# Patient Record
Sex: FEMALE | Race: BLACK OR AFRICAN AMERICAN | NOT HISPANIC OR LATINO | Employment: UNEMPLOYED | URBAN - METROPOLITAN AREA
[De-identification: names, ages, dates, MRNs, and addresses within clinical notes are randomized per-mention and may not be internally consistent; named-entity substitution may affect disease eponyms.]

---

## 2017-02-13 ENCOUNTER — CLINICAL SUPPORT (OUTPATIENT)
Dept: REHABILITATION | Facility: HOSPITAL | Age: 52
End: 2017-02-13
Attending: INTERNAL MEDICINE
Payer: MEDICAID

## 2017-02-13 DIAGNOSIS — M62.81 QUADRICEPS WEAKNESS: ICD-10-CM

## 2017-02-13 DIAGNOSIS — M22.8X2 PATELLAR TRACKING DISORDER OF LEFT KNEE: ICD-10-CM

## 2017-02-13 DIAGNOSIS — M22.8X1 PATELLAR TRACKING DISORDER OF RIGHT KNEE: ICD-10-CM

## 2017-02-13 PROCEDURE — 97161 PT EVAL LOW COMPLEX 20 MIN: CPT

## 2017-02-13 NOTE — PLAN OF CARE
TIME RECORD    Date: 02/13/2017    Start Time:  1:00  Stop Time:  1:45    PROCEDURES:    TIMED  Procedure Min.                         UNTIMED  Procedure Min.   PT eval 45         Total Timed Minutes:  0  Total Timed Units:  0  Total Untimed Units:  1  Charges Billed/# of units:  1    OUTPATIENT PHYSICAL THERAPY   PATIENT EVALUATION  Onset Date: Chronic  Primary Diagnosis: No diagnosis found.  Treatment Diagnosis: weakness, lateral tracking of B patella  Past Medical History   Diagnosis Date    Depression     Hepatitis      HBV    Hyperlipidemia     Liver disease stage 2 liver disease    Sleep apnea      Precautions: Standard  Prior Therapy: PT previously a number of years ago, PT eval last year at this facility for knee pain  Medications: Madison Hill has a current medication list which includes the following prescription(s): amoxicillin, aripiprazole, atorvastatin, citalopram, hydrocodone-acetaminophen 10-325mg, pantoprazole, and tramadol.  Nutrition:  Overweight  History of Present Illness: Chronic B knee pain  Prior Level of Function: Independent  Social History: Lives with friend  Place of Residence (Steps/Adaptations): single story home, no steps  Functional Deficits Leading to Referral/Nature of Injury: increased pain with walking, sitting, running, transfers, and bed mobility  Patient Therapy Goals: to get better    Subjective     Madison Hill states she is not sure what is going on with her knees, something about her kneecaps go to the side. She has been having trouble with her knees for years. Her L knee just gave up on her three weeks ago while she was walking and she , but she didn't get hurt. Currently she has trouble walking because it be hurting a lot, sitting too long and get up it starts hurting, running increases pain but she can't remember when she could last run, she needs help most of the time getting in bed, can't get in/out the car without lifting her legs up with her hands, no  difficulty getting in/out of the shower but she can't get up from bottom of the tub. She can't even walk half a block without pain and her pain medication doesn't help as it is not strong enough.    Pain:  Location: knee L>R  Description: Aching, Burning, Numb, Sharp and all the above  Activities Which Increase Pain: Sitting, Standing, Bending, Walking, Lifting, Getting out of bed/chair and everything  Activities Which Decrease Pain: rest  Pain Scale: 5/10 at best 5/10 now  8/10 at worst    Objective     Posture: knees flexed to 90* in sitting, increased WB on L hip in sitting, lateral tracking of B patella with squatting  Palpation: diffuse tenderness around L patella into quad, medial and lateral joint line L mild crepitus  Sensation: reports decreased light touch over L thigh, L medial calf  DTRs: 2+  Range of Motion/Strength:     Hip Right  Left  Pain/Dysfunction with Movement    AROM MMT AROM MMT    Flexion WFL 3+/5 WFL 3/5    Extension NT 3/5 NT 3/5    Abduction WFL 3+/5 WFL 3+/5       Knee  Right   Left  Pain/Dysfunction with Movement    AROM PROM MMT AROM PROM MMT    Flexion 130 140 3+/5 127 140 3/5 Pain L achilles flex  MMT, ant. L ankle ext MMT   Extension 0 0 4-/5 0 0 4-/5      Ankle Right  Left  Pain/Dysfunction with Movement    AROM MMT AROM MMT    Plantarflexion WFL 5/5 WFL 5/5    Dorsiflexion WFL 4-/5 WFL 4-/5          Flexibility: SLR L 45* pulling in HS, R 62* pulling in HS; B patella hypermobile laterally  Gait: Without AD  Analysis: Assistance none, unremarkable  Bed Mobility:Assistance - reports needing assistance to get out of bed at times  Transfers: Assistance - reports needing help with transfers most of the time  Special Tests: Patella apprehension negative bilaterally, Lachman's, Posterior Drawer, Valgus and Varus stress tests are negative  Other: FOTO knee 39, 60%<80%  Treatment: Patient was educated on the plan of care and treatment options. She is in agreement with the plan of  care.    Assessment       Initial Assessment (Pertinent finding, problem list and factors affecting outcome): Ms. Hill is a 51 year old female, who presents to the clinic with complaints of B knee pain that limits her ability to perform her usual ADLs and transfers without pain. While she was eating [employer] Weez during her evaluation she demonstrated B knee AROM WFL with no complaints of pain. She did demonstrate B LE weakness with complaints of L achilles and anterior ankle pain with L knee MMT. Her LE weakness limits her ability to perform her usual ADLs and transfers without an increase in symptoms. She did perform sit to stand multiple times and bed mobility during the session with no difficulty or complaints of pain. She ambulated inside the clinic with a normal gait pattern and no LOB. She had no ligamentous laxity, negative patella apprehension test, but mild crepitus in B knees. She has diffuse tenderness to palpation in her L quadriceps, around L patella, along medial and lateral L knee joint line. Her score on FOTO 39 places her in the 60%<80% impaired, limited, restricted category. She would benefit from physical therapy to improve her strength and functional mobility.  History  Co-morbidities and personal factors that may impact the plan of care Examination  Body Structures and Functions, activity limitations and participation restrictions that may impact the plan of care Clinical Presentation   Decision Making/ Complexity Score   Co-morbidities:   schizophrenia              Personal Factors:   None Body Regions:B LE    Body Systems: Musculoskeletal - B LE weakness, tenderness to palpation L knee medial and lateral joint line, L quads and patella, B hamstring tightness; Neuromuscular - N/A; Cardiovascular - N/A; Integumentary - N/A          Activity limitations/Participation Restrictions: Patient's schizophrenia may limit her ability to be compliant with her HEP and therapy sessions.         Stable and  uncomplicated Low complexity    FOTO knee 39, 60%<80%       Rehab Potiential: fair    Short Term Goals (4 Weeks):   1. This patient will be independent with a basic HEP.  2. This patient will increase LE strength by 1 grade in order to be able to perform car transfers without UE leverage.   3. This patient will have a pain rating of 5/10 at worst with ADLs.  4. Patient will be able to achieve greater than or equal to 59 on the FOTO knee placing patient in 40%<60% impaired, limited, or restricted category demonstrating overall improved functional ability with lower extremity.   Long Term Goals (8 Weeks):   1. This patient will be independent with an advanced HEP.  2. This patient will have B LE strength of 5/5 in order to ascend/descend three steps with no increase in symptoms.   3. This patient will have a pain rating of 3/10 at worst with ADLs.   4. Patient will be able to achieve greater than or equal to 79 on the FOTO knee placing patient in 20%<40% impaired, limited, or restricted category demonstrating overall improved functional ability with lower extremity.        Plan     Certification Period: 02/13/17 to 04/13/17  Recommended Treatment Plan: 1-2 times per week for 8 weeks: Gait Training, Group Therapy, Manual Therapy, Moist Heat/ Ice, Neuromuscular Re-ed, Patient Education, Therapeutic Exercise and Other modalities prn.  Other Recommendations: None      Therapist: Dana Redd, PT    I CERTIFY THE NEED FOR THESE SERVICES FURNISHED UNDER THIS PLAN OF TREATMENT AND WHILE UNDER MY CARE    Physician's comments: ________________________________________________________________________________________________________________________________________________      Physician's Name: ___________________________________

## 2017-05-18 ENCOUNTER — HOSPITAL ENCOUNTER (OUTPATIENT)
Dept: RADIOLOGY | Facility: HOSPITAL | Age: 52
Discharge: HOME | End: 2017-05-18
Attending: INTERNAL MEDICINE
Payer: MEDICAID

## 2017-05-18 DIAGNOSIS — R05.9 COUGH: Primary | ICD-10-CM

## 2017-05-18 DIAGNOSIS — R05.9 COUGH: ICD-10-CM

## 2017-05-18 PROCEDURE — 71020 XR CHEST PA AND LATERAL: CPT | Mod: TC,PO

## 2017-07-25 ENCOUNTER — HOSPITAL ENCOUNTER (EMERGENCY)
Facility: HOSPITAL | Age: 52
Discharge: PSYCHIATRIC OR SUBSTANCE USE FACILITY | End: 2017-07-25
Attending: EMERGENCY MEDICINE
Payer: MEDICAID

## 2017-07-25 VITALS
BODY MASS INDEX: 30.32 KG/M2 | TEMPERATURE: 98 F | OXYGEN SATURATION: 100 % | SYSTOLIC BLOOD PRESSURE: 115 MMHG | HEART RATE: 67 BPM | DIASTOLIC BLOOD PRESSURE: 78 MMHG | RESPIRATION RATE: 20 BRPM | HEIGHT: 65 IN | WEIGHT: 182 LBS

## 2017-07-25 DIAGNOSIS — F32.A DEPRESSION WITH SUICIDAL IDEATION: Primary | ICD-10-CM

## 2017-07-25 DIAGNOSIS — F20.3 UNDIFFERENTIATED SCHIZOPHRENIA: Chronic | ICD-10-CM

## 2017-07-25 DIAGNOSIS — G89.29 OTHER CHRONIC PAIN: ICD-10-CM

## 2017-07-25 DIAGNOSIS — F14.10 COCAINE ABUSE: ICD-10-CM

## 2017-07-25 DIAGNOSIS — R45.851 DEPRESSION WITH SUICIDAL IDEATION: Primary | ICD-10-CM

## 2017-07-25 LAB
ALBUMIN SERPL BCP-MCNC: 4.3 G/DL
ALP SERPL-CCNC: 90 U/L
ALT SERPL W/O P-5'-P-CCNC: 39 U/L
AMPHET+METHAMPHET UR QL: NEGATIVE
ANION GAP SERPL CALC-SCNC: 8 MMOL/L
APAP SERPL-MCNC: <10 UG/ML
AST SERPL-CCNC: 80 U/L
BACTERIA #/AREA URNS AUTO: NORMAL /HPF
BARBITURATES UR QL SCN>200 NG/ML: NEGATIVE
BASOPHILS # BLD AUTO: 0.02 K/UL
BASOPHILS NFR BLD: 0.5 %
BENZODIAZ UR QL SCN>200 NG/ML: NEGATIVE
BILIRUB SERPL-MCNC: 0.6 MG/DL
BILIRUB UR QL STRIP: NEGATIVE
BUN SERPL-MCNC: 11 MG/DL
BZE UR QL SCN: NORMAL
CALCIUM SERPL-MCNC: 9.6 MG/DL
CANNABINOIDS UR QL SCN: NEGATIVE
CHLORIDE SERPL-SCNC: 104 MMOL/L
CLARITY UR REFRACT.AUTO: CLEAR
CO2 SERPL-SCNC: 27 MMOL/L
COLOR UR AUTO: YELLOW
CREAT SERPL-MCNC: 0.74 MG/DL
CREAT UR-MCNC: 141.4 MG/DL
DIFFERENTIAL METHOD: ABNORMAL
EOSINOPHIL # BLD AUTO: 0.1 K/UL
EOSINOPHIL NFR BLD: 1.4 %
ERYTHROCYTE [DISTWIDTH] IN BLOOD BY AUTOMATED COUNT: 15.9 %
EST. GFR  (AFRICAN AMERICAN): >60 ML/MIN/1.73 M^2
EST. GFR  (NON AFRICAN AMERICAN): >60 ML/MIN/1.73 M^2
ETHANOL SERPL-MCNC: <10 MG/DL
GLUCOSE SERPL-MCNC: 85 MG/DL
GLUCOSE UR QL STRIP: NEGATIVE
HCT VFR BLD AUTO: 46.1 %
HGB BLD-MCNC: 15.1 G/DL
HGB UR QL STRIP: NEGATIVE
KETONES UR QL STRIP: NEGATIVE
LEUKOCYTE ESTERASE UR QL STRIP: ABNORMAL
LYMPHOCYTES # BLD AUTO: 1.8 K/UL
LYMPHOCYTES NFR BLD: 41.4 %
MCH RBC QN AUTO: 25.7 PG
MCHC RBC AUTO-ENTMCNC: 32.8 G/DL
MCV RBC AUTO: 78 FL
METHADONE UR QL SCN>300 NG/ML: NEGATIVE
MICROSCOPIC COMMENT: NORMAL
MONOCYTES # BLD AUTO: 0.3 K/UL
MONOCYTES NFR BLD: 6.9 %
NEUTROPHILS # BLD AUTO: 2.2 K/UL
NEUTROPHILS NFR BLD: 49.8 %
NITRITE UR QL STRIP: NEGATIVE
OPIATES UR QL SCN: NEGATIVE
PCP UR QL SCN>25 NG/ML: NEGATIVE
PH UR STRIP: 5 [PH] (ref 5–8)
PLATELET # BLD AUTO: 189 K/UL
PMV BLD AUTO: 12 FL
POTASSIUM SERPL-SCNC: 4.1 MMOL/L
PROT SERPL-MCNC: 8.8 G/DL
PROT UR QL STRIP: NEGATIVE
RBC # BLD AUTO: 5.88 M/UL
RBC #/AREA URNS AUTO: 0 /HPF (ref 0–4)
SODIUM SERPL-SCNC: 139 MMOL/L
SP GR UR STRIP: 1.01 (ref 1–1.03)
SQUAMOUS #/AREA URNS AUTO: 6 /HPF
TOXICOLOGY INFORMATION: NORMAL
TSH SERPL DL<=0.005 MIU/L-ACNC: 2.84 UIU/ML
URN SPEC COLLECT METH UR: ABNORMAL
UROBILINOGEN UR STRIP-ACNC: NEGATIVE EU/DL
WBC # BLD AUTO: 4.37 K/UL
WBC #/AREA URNS AUTO: 3 /HPF (ref 0–5)

## 2017-07-25 PROCEDURE — 84443 ASSAY THYROID STIM HORMONE: CPT

## 2017-07-25 PROCEDURE — 81000 URINALYSIS NONAUTO W/SCOPE: CPT

## 2017-07-25 PROCEDURE — 80320 DRUG SCREEN QUANTALCOHOLS: CPT

## 2017-07-25 PROCEDURE — 80307 DRUG TEST PRSMV CHEM ANLYZR: CPT

## 2017-07-25 PROCEDURE — 80053 COMPREHEN METABOLIC PANEL: CPT

## 2017-07-25 PROCEDURE — 80329 ANALGESICS NON-OPIOID 1 OR 2: CPT

## 2017-07-25 PROCEDURE — 85025 COMPLETE CBC W/AUTO DIFF WBC: CPT

## 2017-07-25 PROCEDURE — 99285 EMERGENCY DEPT VISIT HI MDM: CPT

## 2017-07-25 NOTE — ED NOTES
Pt updated on reasoning for delay in transfer process due to pending lab results, verbalized understanding. Pt calm and cooperative.

## 2017-07-25 NOTE — ED NOTES
Pts belongings: 1 pair of black sandals, 1 blue and white striped shirt, 1 pair of blue jeans, 1 black bra, 1 brown colored purse containin green pack of mayela cigarettes, 1 black ZTE cell phone, 2 keys, 1 exp. [place] ID card, 1 [employer] insurance card. Purse and purse contents given to [facility] security in a sealed pt valuables bag.

## 2017-07-25 NOTE — ED NOTES
[facility] transport notified of pts need for transport to [facility]. Awaiting return phone call.

## 2017-07-25 NOTE — ED NOTES
Stella, intake nurse for [facility], notified of pts medical clearance. Pts acceptance pending review. Awaiting return phone call regarding whether pt has been accepted.

## 2017-07-25 NOTE — ED NOTES
Lunch provided (lean cuisine meal with a powerade/water cup), pt tolerating well. Pt sitting in bed calmly eating. NAD.

## 2017-07-25 NOTE — ED NOTES
Pt accepted to [facility] in [place] per Elvia once pt is medically cleared. Call Elvia at [phone] once pt is medically cleared and fax paperwork to [phone]

## 2017-07-25 NOTE — ED NOTES
Meal provided (lean cuisine meal with a powerade/water cup). Pt sitting in bed calmly eating, calm and cooperative. NAD.

## 2017-07-25 NOTE — ED PROVIDER NOTES
Encounter Date: 7/25/2017       History     Chief Complaint   Patient presents with    Psychiatric Evaluation     Pt arrived via [facility] PEC'd by mental health clinic due to SI with plan and + auditory and + visual hallucinations.        Pt is a 51 yr old female with cocaine abuse who arrives via [facility] PEC'd by mental health   clinic due to SI with plan and + auditory and + visual hallucinations.                 Review of patient's allergies indicates:   Allergen Reactions    Corticosteroids (glucocorticoids) Palpitations    Morphine Anxiety     Past Medical History:   Diagnosis Date    Depression     Hepatitis     HBV    Hyperlipidemia     Liver disease stage 2 liver disease    Sleep apnea      Past Surgical History:   Procedure Laterality Date    HYSTERECTOMY      LIVER BIOPSY  10/16/2014    Stage 2 fibrosis     Family History   Problem Relation Age of Onset    Colon cancer Neg Hx      Social History   Substance Use Topics    Smoking status: Light Tobacco Smoker     Packs/day: 0.50     Types: Cigarettes    Smokeless tobacco: Never Used    Alcohol use No     Review of Systems   Constitutional: Negative for fever.   HENT: Negative for sore throat.    Respiratory: Negative for shortness of breath.    Cardiovascular: Negative for chest pain.   Gastrointestinal: Negative for nausea.   Genitourinary: Negative for dysuria.   Musculoskeletal: Negative for back pain.   Skin: Negative for rash.   Neurological: Negative for weakness.   Hematological: Does not bruise/bleed easily.   Psychiatric/Behavioral: Positive for hallucinations.   All other systems reviewed and are negative.      Physical Exam     Initial Vitals [07/25/17 1154]   BP Pulse Resp Temp SpO2   116/76 63 18 98.2 °F (36.8 °C) 98 %      MAP       89.33         Physical Exam    Constitutional: Vital signs are normal. She appears well-developed and well-nourished. She is cooperative.   HENT:   Head: Normocephalic and atraumatic.   Eyes:  Conjunctivae, EOM and lids are normal.   Neck: Trachea normal and normal range of motion. Neck supple. No stridor present. No tracheal deviation present. No neck rigidity. No Brudzinski's sign and no Kernig's sign noted.   Cardiovascular: Normal rate, regular rhythm and normal pulses.   Abdominal: Soft. Normal appearance and bowel sounds are normal. She exhibits no abdominal bruit. There is no tenderness. There is no rebound.   Neurological: She is alert and oriented to person, place, and time. She has normal strength. GCS eye subscore is 4. GCS verbal subscore is 5. GCS motor subscore is 6.   Skin: Skin is warm, dry and intact. Capillary refill takes less than 2 seconds.   Psychiatric: She has a normal mood and affect. Her behavior is normal. Judgment normal. Thought content is not delusional. She expresses no homicidal ideation.         ED Course   Procedures  Labs Reviewed   COMPREHENSIVE METABOLIC PANEL - Abnormal; Notable for the following:        Result Value    Total Protein 8.8 (*)     AST 80 (*)     All other components within normal limits   CBC W/ AUTO DIFFERENTIAL - Abnormal; Notable for the following:     RBC 5.88 (*)     MCV 78 (*)     MCH 25.7 (*)     RDW 15.9 (*)     All other components within normal limits   URINALYSIS - Abnormal; Notable for the following:     Leukocytes, UA 2+ (*)     All other components within normal limits   TSH   DRUG SCREEN PANEL, URINE EMERGENCY   ALCOHOL,MEDICAL (ETHANOL)   ACETAMINOPHEN LEVEL   URINALYSIS MICROSCOPIC         Results for orders placed or performed during the hospital encounter of 07/25/17   Comprehensive metabolic panel   Result Value Ref Range    Sodium 139 136 - 145 mmol/L    Potassium 4.1 3.5 - 5.1 mmol/L    Chloride 104 95 - 110 mmol/L    CO2 27 23 - 29 mmol/L    Glucose 85 70 - 110 mg/dL    BUN, Bld 11 7 - 17 mg/dL    Creatinine 0.74 0.50 - 1.40 mg/dL    Calcium 9.6 8.7 - 10.5 mg/dL    Total Protein 8.8 (H) 6.0 - 8.4 g/dL    Albumin 4.3 3.5 - 5.2 g/dL     Total Bilirubin 0.6 0.1 - 1.0 mg/dL    Alkaline Phosphatase 90 38 - 126 U/L    AST 80 (H) 15 - 46 U/L    ALT 39 10 - 44 U/L    Anion Gap 8 8 - 16 mmol/L    eGFR if African American >60.0 >60 mL/min/1.73 m^2    eGFR if non African American >60.0 >60 mL/min/1.73 m^2   TSH   Result Value Ref Range    TSH 2.840 0.400 - 4.000 uIU/mL   CBC auto differential   Result Value Ref Range    WBC 4.37 3.90 - 12.70 K/uL    RBC 5.88 (H) 4.00 - 5.40 M/uL    Hemoglobin 15.1 12.0 - 16.0 g/dL    Hematocrit 46.1 37.0 - 48.5 %    MCV 78 (L) 82 - 98 fL    MCH 25.7 (L) 27.0 - 31.0 pg    MCHC 32.8 32.0 - 36.0 g/dL    RDW 15.9 (H) 11.5 - 14.5 %    Platelets 189 150 - 350 K/uL    MPV 12.0 9.2 - 12.9 fL    Gran # 2.2 1.8 - 7.7 K/uL    Lymph # 1.8 1.0 - 4.8 K/uL    Mono # 0.3 0.3 - 1.0 K/uL    Eos # 0.1 0.0 - 0.5 K/uL    Baso # 0.02 0.00 - 0.20 K/uL    Gran% 49.8 38.0 - 73.0 %    Lymph% 41.4 18.0 - 48.0 %    Mono% 6.9 4.0 - 15.0 %    Eosinophil% 1.4 0.0 - 8.0 %    Basophil% 0.5 0.0 - 1.9 %    Differential Method Automated    Urinalysis   Result Value Ref Range    Specimen UA Urine, Clean Catch     Color, UA Yellow Yellow, Straw, Aixa    Appearance, UA Clear Clear    pH, UA 5.0 5.0 - 8.0    Specific Gravity, UA 1.015 1.005 - 1.030    Protein, UA Negative Negative    Glucose, UA Negative Negative    Ketones, UA Negative Negative    Bilirubin (UA) Negative Negative    Occult Blood UA Negative Negative    Nitrite, UA Negative Negative    Urobilinogen, UA Negative <2.0 EU/dL    Leukocytes, UA 2+ (A) Negative   Drug screen panel, emergency   Result Value Ref Range    Benzodiazepines Negative     Methadone metabolites Negative     Cocaine (Metab.) Presumptive Positive     Opiate Scrn, Ur Negative     Barbiturate Screen, Ur Negative     Amphetamine Screen, Ur Negative     THC Negative     Phencyclidine Negative     Creatinine, Random Ur 141.4 15.0 - 325.0 mg/dL    Toxicology Information @UDS    Ethanol   Result Value Ref Range    Alcohol, Medical,  Serum <10 <10 mg/dL   Acetaminophen level   Result Value Ref Range    Acetaminophen (Tylenol), Serum <10.0 10.0 - 20.0 ug/mL   Urinalysis Microscopic   Result Value Ref Range    RBC, UA 0 0 - 4 /hpf    WBC, UA 3 0 - 5 /hpf    Bacteria, UA Occasional None-Occ /hpf    Squam Epithel, UA 6 /hpf    Microscopic Comment SEE COMMENT                    - PEC:  PEC hold placed on patient.       - Re-evaluation: The patient is resting comfortably and is in no acute distress . She has been medically cleared and is awaiting placement/transfer to a psychiatric facility for further evaluation.     [id number] Transfer of Care: Psychiatric service(s) is/are not available at this facility. Will transfer patient to Psychiatric Facility for Psychiatric Evaluation.  Notified patient and family of need for transfer.  They understand and agree with the plan as discussed. Answered questions at this time.                ED Course     Clinical Impression:   The primary encounter diagnosis was Depression with suicidal ideation. Diagnoses of Cocaine abuse, Undifferentiated schizophrenia, and Other chronic pain were also pertinent to this visit.                           Figueroa Hoyos MD  07/25/17 1651       Figueroa Hoyos MD  07/25/17 1651

## 2017-07-25 NOTE — ED NOTES
PEC intake packet faxed to [facility] at [phone].

## 2017-07-25 NOTE — ED TRIAGE NOTES
"Pt reports bilateral ear pain, throat pain but denies sore throat, "lung pain"  and right upper abdominal pain that started this am. Pt denies urinary difficulty, denies cough  "

## 2017-07-26 NOTE — ED NOTES
[facility] contacted at this time in order to get an ETA for transport arrival, per Elvi drivers are approx. 1.5 hours away.

## 2017-07-26 NOTE — ED NOTES
Pt updated that [facility] transport will take approx 1.5 hours in order to arrive, verbalized understanding. Pt sitting in bed, calm and cooperative. NAD noted.

## 2017-07-26 NOTE — ED NOTES
Pts belongings (including items given to security) given to [facility] unit [id number].

## 2017-08-15 ENCOUNTER — LAB VISIT (OUTPATIENT)
Dept: LAB | Facility: HOSPITAL | Age: 52
End: 2017-08-15
Attending: INTERNAL MEDICINE
Payer: MEDICAID

## 2017-08-15 DIAGNOSIS — E78.2 MIXED HYPERLIPIDEMIA: Primary | ICD-10-CM

## 2017-08-15 LAB
ALBUMIN SERPL BCP-MCNC: 4.3 G/DL
ALP SERPL-CCNC: 80 U/L
ALT SERPL W/O P-5'-P-CCNC: 52 U/L
ANION GAP SERPL CALC-SCNC: 11 MMOL/L
AST SERPL-CCNC: 26 U/L
BASOPHILS # BLD AUTO: 0.02 K/UL
BASOPHILS NFR BLD: 0.3 %
BILIRUB SERPL-MCNC: 0.4 MG/DL
BUN SERPL-MCNC: 11 MG/DL
CALCIUM SERPL-MCNC: 9.8 MG/DL
CHLORIDE SERPL-SCNC: 105 MMOL/L
CHOLEST/HDLC SERPL: 6.7 {RATIO}
CO2 SERPL-SCNC: 28 MMOL/L
CREAT SERPL-MCNC: 1 MG/DL
DIFFERENTIAL METHOD: ABNORMAL
EOSINOPHIL # BLD AUTO: 0.1 K/UL
EOSINOPHIL NFR BLD: 1.5 %
ERYTHROCYTE [DISTWIDTH] IN BLOOD BY AUTOMATED COUNT: 15.3 %
EST. GFR  (AFRICAN AMERICAN): >60 ML/MIN/1.73 M^2
EST. GFR  (NON AFRICAN AMERICAN): >60 ML/MIN/1.73 M^2
GLUCOSE SERPL-MCNC: 92 MG/DL
HCT VFR BLD AUTO: 40 %
HDL/CHOLESTEROL RATIO: 14.9 %
HDLC SERPL-MCNC: 288 MG/DL
HDLC SERPL-MCNC: 43 MG/DL
HGB BLD-MCNC: 13.1 G/DL
LDLC SERPL CALC-MCNC: 211.2 MG/DL
LYMPHOCYTES # BLD AUTO: 2.5 K/UL
LYMPHOCYTES NFR BLD: 42.4 %
MCH RBC QN AUTO: 25.8 PG
MCHC RBC AUTO-ENTMCNC: 32.8 G/DL
MCV RBC AUTO: 79 FL
MONOCYTES # BLD AUTO: 0.4 K/UL
MONOCYTES NFR BLD: 6.3 %
NEUTROPHILS # BLD AUTO: 3 K/UL
NEUTROPHILS NFR BLD: 49.3 %
NONHDLC SERPL-MCNC: 245 MG/DL
PLATELET # BLD AUTO: 201 K/UL
PMV BLD AUTO: 11.3 FL
POTASSIUM SERPL-SCNC: 4.3 MMOL/L
PROT SERPL-MCNC: 8.4 G/DL
RBC # BLD AUTO: 5.08 M/UL
SODIUM SERPL-SCNC: 144 MMOL/L
TRIGL SERPL-MCNC: 169 MG/DL
TSH SERPL DL<=0.005 MIU/L-ACNC: 3.98 UIU/ML
WBC # BLD AUTO: 5.99 K/UL

## 2017-08-15 PROCEDURE — 80053 COMPREHEN METABOLIC PANEL: CPT | Mod: PO

## 2017-08-15 PROCEDURE — 84443 ASSAY THYROID STIM HORMONE: CPT | Mod: PO

## 2017-08-15 PROCEDURE — 36415 COLL VENOUS BLD VENIPUNCTURE: CPT | Mod: PO

## 2017-08-15 PROCEDURE — 85025 COMPLETE CBC W/AUTO DIFF WBC: CPT | Mod: PO

## 2017-08-15 PROCEDURE — 80061 LIPID PANEL: CPT

## 2017-08-21 ENCOUNTER — HOSPITAL ENCOUNTER (EMERGENCY)
Facility: HOSPITAL | Age: 52
Discharge: PSYCHIATRIC OR SUBSTANCE USE FACILITY | End: 2017-08-21
Attending: FAMILY MEDICINE
Payer: MEDICAID

## 2017-08-21 VITALS
SYSTOLIC BLOOD PRESSURE: 124 MMHG | HEART RATE: 68 BPM | TEMPERATURE: 99 F | BODY MASS INDEX: 30.99 KG/M2 | OXYGEN SATURATION: 100 % | WEIGHT: 186 LBS | DIASTOLIC BLOOD PRESSURE: 84 MMHG | HEIGHT: 65 IN | RESPIRATION RATE: 20 BRPM

## 2017-08-21 DIAGNOSIS — R45.851 DEPRESSION WITH SUICIDAL IDEATION: Primary | ICD-10-CM

## 2017-08-21 DIAGNOSIS — F32.A DEPRESSION WITH SUICIDAL IDEATION: Primary | ICD-10-CM

## 2017-08-21 DIAGNOSIS — F14.10 COCAINE ABUSE: ICD-10-CM

## 2017-08-21 DIAGNOSIS — N39.0 URINARY TRACT INFECTION WITHOUT HEMATURIA, SITE UNSPECIFIED: ICD-10-CM

## 2017-08-21 LAB
ALBUMIN SERPL BCP-MCNC: 4.1 G/DL
ALP SERPL-CCNC: 82 U/L
ALT SERPL W/O P-5'-P-CCNC: 32 U/L
AMPHET+METHAMPHET UR QL: NEGATIVE
ANION GAP SERPL CALC-SCNC: 11 MMOL/L
APAP SERPL-MCNC: <10 UG/ML
AST SERPL-CCNC: 23 U/L
BARBITURATES UR QL SCN>200 NG/ML: NEGATIVE
BASOPHILS # BLD AUTO: 0.02 K/UL
BASOPHILS NFR BLD: 0.5 %
BENZODIAZ UR QL SCN>200 NG/ML: NEGATIVE
BILIRUB SERPL-MCNC: 0.5 MG/DL
BILIRUB UR QL STRIP: NEGATIVE
BUN SERPL-MCNC: 15 MG/DL
BZE UR QL SCN: NORMAL
CALCIUM SERPL-MCNC: 9.1 MG/DL
CANNABINOIDS UR QL SCN: NEGATIVE
CHLORIDE SERPL-SCNC: 106 MMOL/L
CLARITY UR REFRACT.AUTO: CLEAR
CO2 SERPL-SCNC: 27 MMOL/L
COLOR UR AUTO: YELLOW
CREAT SERPL-MCNC: 0.85 MG/DL
CREAT UR-MCNC: 216 MG/DL
DIFFERENTIAL METHOD: ABNORMAL
EOSINOPHIL # BLD AUTO: 0.1 K/UL
EOSINOPHIL NFR BLD: 2.1 %
ERYTHROCYTE [DISTWIDTH] IN BLOOD BY AUTOMATED COUNT: 15.5 %
EST. GFR  (AFRICAN AMERICAN): >60 ML/MIN/1.73 M^2
EST. GFR  (NON AFRICAN AMERICAN): >60 ML/MIN/1.73 M^2
ETHANOL SERPL-MCNC: <10 MG/DL
GLUCOSE SERPL-MCNC: 93 MG/DL
GLUCOSE UR QL STRIP: NEGATIVE
HCT VFR BLD AUTO: 40.7 %
HGB BLD-MCNC: 13.4 G/DL
HGB UR QL STRIP: NEGATIVE
KETONES UR QL STRIP: ABNORMAL
LEUKOCYTE ESTERASE UR QL STRIP: ABNORMAL
LYMPHOCYTES # BLD AUTO: 1.5 K/UL
LYMPHOCYTES NFR BLD: 40.4 %
MCH RBC QN AUTO: 25.8 PG
MCHC RBC AUTO-ENTMCNC: 32.9 G/DL
MCV RBC AUTO: 78 FL
METHADONE UR QL SCN>300 NG/ML: NEGATIVE
MICROSCOPIC COMMENT: ABNORMAL
MONOCYTES # BLD AUTO: 0.4 K/UL
MONOCYTES NFR BLD: 9.6 %
NEUTROPHILS # BLD AUTO: 1.8 K/UL
NEUTROPHILS NFR BLD: 47.4 %
NITRITE UR QL STRIP: NEGATIVE
OPIATES UR QL SCN: NEGATIVE
PCP UR QL SCN>25 NG/ML: NEGATIVE
PH UR STRIP: 6 [PH] (ref 5–8)
PLATELET # BLD AUTO: 169 K/UL
PMV BLD AUTO: 11 FL
POTASSIUM SERPL-SCNC: 3.9 MMOL/L
PROT SERPL-MCNC: 7.9 G/DL
PROT UR QL STRIP: ABNORMAL
RBC # BLD AUTO: 5.2 M/UL
SODIUM SERPL-SCNC: 144 MMOL/L
SP GR UR STRIP: 1.02 (ref 1–1.03)
TOXICOLOGY INFORMATION: NORMAL
URN SPEC COLLECT METH UR: ABNORMAL
UROBILINOGEN UR STRIP-ACNC: 1 EU/DL
WBC # BLD AUTO: 3.76 K/UL
WBC #/AREA URNS AUTO: 7 /HPF (ref 0–5)

## 2017-08-21 PROCEDURE — 80307 DRUG TEST PRSMV CHEM ANLYZR: CPT

## 2017-08-21 PROCEDURE — 25000003 PHARM REV CODE 250: Performed by: FAMILY MEDICINE

## 2017-08-21 PROCEDURE — 80053 COMPREHEN METABOLIC PANEL: CPT

## 2017-08-21 PROCEDURE — 80320 DRUG SCREEN QUANTALCOHOLS: CPT

## 2017-08-21 PROCEDURE — 99285 EMERGENCY DEPT VISIT HI MDM: CPT

## 2017-08-21 PROCEDURE — 85025 COMPLETE CBC W/AUTO DIFF WBC: CPT

## 2017-08-21 PROCEDURE — 81000 URINALYSIS NONAUTO W/SCOPE: CPT

## 2017-08-21 PROCEDURE — 80329 ANALGESICS NON-OPIOID 1 OR 2: CPT

## 2017-08-21 RX ORDER — NITROFURANTOIN 25; 75 MG/1; MG/1
100 CAPSULE ORAL
Status: COMPLETED | OUTPATIENT
Start: 2017-08-21 | End: 2017-08-21

## 2017-08-21 RX ORDER — NITROFURANTOIN 25; 75 MG/1; MG/1
100 CAPSULE ORAL 2 TIMES DAILY
Qty: 10 CAPSULE | Refills: 0 | Status: SHIPPED | OUTPATIENT
Start: 2017-08-21 | End: 2017-08-26

## 2017-08-21 RX ADMIN — NITROFURANTOIN (MONOHYDRATE/MACROCRYSTALS) 100 MG: 75; 25 CAPSULE ORAL at 04:08

## 2017-08-21 NOTE — CONSULTS
"Tele-Consultation to Emergency Department from Psychiatry    Please see previous notes: From today's presentation: "51-year-old female presents with a chief complaint of schizophrenia, bipolar disorder, and depression.  Patient reports is addicted to crack cocaine and last did cocaine last night.  Patient states was depressed and that the cocaine to feel better.  Patient denies any suicidal or homicidal ideation.  Denies any hallucinations.  Does report was last in [facility] a few weeks ago.  Patient reports would like to go back to the psychiatric facility to try to help her with her addiction.  Did discuss that she probably needs to seek inpatient addiction care which the patient states has never done before.  Patient denies any fever or chills.  Denies any chest pain or shortness of breath."    Patient also had an ER visit on 7/25/17 for depression with suicidal ideation.     Patient agreeable to consultation via telepsychiatry.    Consultation started: 8/21/2017 at 12:41 PM  The chief complaint leading to psychiatric consultation is: shizophrenia  This consultation was requested by Dr. Earl, the Emergency Department attending physician.  The location of the consulting psychiatrist is [address] [place] [address].  The patient location is [facility].  The patient arrived at the ED at: 10:20am  Also present with the patient at the time of the consultation: ER physician    Patient Identification:  Madison Hill is a 51 y.o. female.    Patient information was obtained from patient, past medical records.  Patient presented voluntarily to the Emergency Department.    History of Present Illness:  Patient has a history of schizophrenia, multiple past psychiatric hospitalizations. About 2-3 weeks ago she was discharged from [facility], she is prescribed abilify but did not pick it up after her hospital discharge "because I didn't have a ride." Reports abilify is " "typically effective for her symptoms. She is also prescribed other medications that she cannot recall. She reports severe depression with active suicidal ideation, states she wants to kill herself, denies current plan, reports that earlier this week she attempted to get a knife to cut her wrists but her fiance stopped her. Reports that she has been using crack cocaine, last use last night. She reports auditory hallucinations and paranoid ideation. She is not responding to internal stimuli. She is extremely irritable during consult and about ten minutes in she asked "Why are you asking all these questions? Don't ask any more, I don't feel like talking."    Psychiatric History:   Hospitalization: yes  Medication Trials: unknown -- was discharged from [facility] on abilify, chart indicates she has also been prescribed celexa   Suicide Attempts: multiple, last earlier this week   Violence: yes  Depression: yes  Marcia: refused to answer  AH's: yes, states she can't make out what they're saying  Delusions: paranoid ideation "all the time"    Review of Systems    Past Medical History:   Past Medical History:   Diagnosis Date    Depression     Hepatitis     HBV    Hyperlipidemia     Liver disease stage 2 liver disease    Sleep apnea         Seizures: denies  Head trauma/l.o.c.: denies    Review of patient's allergies indicates:   Allergen Reactions    Corticosteroids (glucocorticoids) Palpitations    Morphine Anxiety       Medications in ER: Medications - No data to display    Home Meds: abilify    Substance Abuse History:   Alchohol: about once week, 2 cans of beer  Drug: crack cocaine last use last night. She is a smoker but would not disclose how many cigarettes per day.     Legal History:   Past charges/incarcerations: refused to answer   Pending charges: refused to answer    Family Psychiatric History: denies    Social History:   History of Physical/Sexual Abuse: refused to answer  Education: refused to " "answer  Employment/Disability: unemployed, not on disability  Financial: denisa is on disability  Relationship Status/Sexual Orientation: refused to answer  Children: refused to answer  Housing Status: lives at home with denisa  Jew: refused to answer   History: refused to answer  Access to Gun: refused to answer    Current Evaluation:     Constitutional  Vitals:  Vitals:    08/21/17 1025   BP: 124/74   Pulse: 78   Resp: 18   Temp: 98 °F (36.7 °C)   TempSrc: Oral   SpO2: 97%   Weight: 84.4 kg (186 lb)   Height: 5' 5" (1.651 m)      General:  unremarkable, age appropriate     Musculoskeletal  Muscle Strength/Tone:   moving arms normally   Gait & Station:   sitting on stretcher     Psychiatric  Level of Consciousness: alert  Orientation: oriented to person and place, but guess the day of the week incorrectly as Saturday, the month incorrectly as September, and the year incorrectly as 2016  Grooming: in hospital gown   Psychomotor Behavior: no agitation  Speech: terse, non-spontaneous  Language: uses words appropriately  Mood: depressed  Affect: irritable  Thought Process: logical and goal-directed  Associations: intact  Thought Content: active suicidal ideation, auditory hallucinations, and paranoid ideation  Memory: appears impaired but patient may have been refusing to answer  Attention: intact to interview  Fund of Knowledge: did not know the current president  Insight: poor as evidenced by refusal to continue to give information  Judgement: poor as evidenced by non-compliance with medication and recent crack-cocaine use    Relevant Elements of Neurological Exam: no abnormality of posture noted    Assessment - Diagnosis - Goals:     Diagnosis/Impression: schizophrenia, undifferentiated; major depressive disorder, recurrent, severe; cocaine use disorder, severe. Patient reports active suicidal ideation, she is currently a danger to herself.     Rec: medical clearance, PEC and psychiatric " hospitalization. Re-start abilify 5mg daily first dose now. May use haldol 5mg/ativan 2mg/benadryl 50mg po or IM for non-redirectible agitation.     Time with patient: 15 minutes    More than 50% of the time was spent counseling/coordinating care    Laboratory Data:   Labs Reviewed   CBC W/ AUTO DIFFERENTIAL - Abnormal; Notable for the following:        Result Value    WBC 3.76 (*)     MCV 78 (*)     MCH 25.8 (*)     RDW 15.5 (*)     All other components within normal limits   COMPREHENSIVE METABOLIC PANEL   ALCOHOL,MEDICAL (ETHANOL)   ACETAMINOPHEN LEVEL   URINALYSIS   DRUG SCREEN PANEL, URINE EMERGENCY   URINALYSIS MICROSCOPIC         Consulting clinician was informed of the encounter and consult note.    Consultation ended: 8/21/2017 at 1:11pm

## 2017-08-21 NOTE — ED NOTES
Packet was faxed to: Critical access hospital,[place], [facility], [facility], [facility], [facility], [facility], [facility], [facility], [facility], [facility], [facility], [facility], [place], [facility], [facility], [facility], [facility], [facility], [facility], [facility], [facility], [facility], [place], [facility],  [place], [facility], [facility], [facility], and [facility].

## 2017-08-21 NOTE — ED PROVIDER NOTES
"Encounter Date: 8/21/2017       History     Chief Complaint   Patient presents with    Addiction Problem     Pt states has a "real bad drug problem" and needs "help getting off."  Pt states last smoked crack cocaine last night, states uses crack cocaine approx 3-4 times/month.  Pt denies SI/HI, states "I feel depressed."       51-year-old female presents with a chief complaint of schizophrenia, bipolar disorder, and depression.  Patient reports is addicted to crack cocaine and last did cocaine last night.  Patient states was depressed and that the cocaine to feel better.  Patient denies any suicidal or homicidal ideation.  Denies any hallucinations.  Does report was last in [facility] a few weeks ago.  Patient reports would like to go back to the psychiatric facility to try to help her with her addiction.  Did discuss that she probably needs to seek inpatient addiction care which the patient states has never done before.  Patient denies any fever or chills.  Denies any chest pain or shortness of breath.          Review of patient's allergies indicates:   Allergen Reactions    Corticosteroids (glucocorticoids) Palpitations    Morphine Anxiety     Past Medical History:   Diagnosis Date    Depression     Hepatitis     HBV    Hyperlipidemia     Liver disease stage 2 liver disease    Sleep apnea      Past Surgical History:   Procedure Laterality Date    HYSTERECTOMY      LIVER BIOPSY  10/16/2014    Stage 2 fibrosis     Family History   Problem Relation Age of Onset    No Known Problems Mother     No Known Problems Father     Colon cancer Neg Hx      Social History   Substance Use Topics    Smoking status: Light Tobacco Smoker     Packs/day: 0.50     Types: Cigarettes    Smokeless tobacco: Never Used    Alcohol use No     Review of Systems   Constitutional: Negative for chills and fever.   Psychiatric/Behavioral: Positive for dysphoric mood. Negative for behavioral problems, confusion and " hallucinations.   All other systems reviewed and are negative.      Physical Exam     Initial Vitals [08/21/17 1025]   BP Pulse Resp Temp SpO2   124/74 78 18 98 °F (36.7 °C) 97 %      MAP       90.67         Physical Exam    Nursing note and vitals reviewed.  Constitutional: She appears well-developed and well-nourished.   HENT:   Head: Normocephalic and atraumatic.   Eyes: EOM are normal. Pupils are equal, round, and reactive to light.   Neck: Normal range of motion. Neck supple.   Cardiovascular: Normal rate, regular rhythm and normal heart sounds.   Pulmonary/Chest: Breath sounds normal.   Abdominal: Soft. Bowel sounds are normal.   Musculoskeletal: Normal range of motion.   Neurological: She is alert and oriented to person, place, and time.   Skin: Skin is warm. Capillary refill takes less than 2 seconds.   Psychiatric: She has a normal mood and affect. Her speech is normal and behavior is normal. Thought content is not paranoid and not delusional. She expresses impulsivity. She expresses no homicidal and no suicidal ideation.         ED Course   Procedures  Labs Reviewed   CBC W/ AUTO DIFFERENTIAL   COMPREHENSIVE METABOLIC PANEL   URINALYSIS   DRUG SCREEN PANEL, URINE EMERGENCY   ALCOHOL,MEDICAL (ETHANOL)   ACETAMINOPHEN LEVEL                              ED Course    1:15 PM patient seen by psychiatric nurse practitioner now claiming that his having active suicidal ideation plan to admit the patient.  Patient medically clear for psychiatric referral  Clinical Impression:   The primary encounter diagnosis was Depression with suicidal ideation. A diagnosis of Cocaine abuse was also pertinent to this visit.                           Mike Earl MD  08/22/17 0751

## 2017-08-21 NOTE — ED NOTES
PT resting comfortably RT side lying on stretcher with ou closed.  Easily aroused.  Resp even and unlabored.  Skin warm and dry.  NAD noted.  PT denies any c/o or needs at present.  Will continue to monitor.

## 2017-08-22 NOTE — ED NOTES
Pt updated on delay for transport. Voiced understanding. Pt calm and  Cooperative. Awaiting transport. Snacks provided. Sitter at bedside.will continue to monitor.

## 2017-08-22 NOTE — ED NOTES
Report received at bedside. Pt resting comfortably. Awake and alert. Updated on current status and plan of care. Denies needs. Sitter at bedside. Awaiting transport. Will continue to monitor.

## 2017-12-05 ENCOUNTER — HOSPITAL ENCOUNTER (EMERGENCY)
Facility: HOSPITAL | Age: 52
Discharge: HOME | End: 2017-12-05
Payer: MEDICAID

## 2017-12-05 VITALS
HEART RATE: 88 BPM | BODY MASS INDEX: 33.32 KG/M2 | DIASTOLIC BLOOD PRESSURE: 89 MMHG | SYSTOLIC BLOOD PRESSURE: 133 MMHG | OXYGEN SATURATION: 98 % | RESPIRATION RATE: 20 BRPM | TEMPERATURE: 98 F | WEIGHT: 200 LBS | HEIGHT: 65 IN

## 2017-12-05 DIAGNOSIS — G89.29 CHRONIC PAIN OF LEFT KNEE: Primary | ICD-10-CM

## 2017-12-05 DIAGNOSIS — M25.562 CHRONIC PAIN OF LEFT KNEE: Primary | ICD-10-CM

## 2017-12-05 DIAGNOSIS — J06.9 UPPER RESPIRATORY TRACT INFECTION, UNSPECIFIED TYPE: ICD-10-CM

## 2017-12-05 LAB
FLUAV AG SPEC QL IA: NEGATIVE
FLUBV AG SPEC QL IA: NEGATIVE
SPECIMEN SOURCE: NORMAL

## 2017-12-05 PROCEDURE — 25000003 PHARM REV CODE 250: Performed by: NURSE PRACTITIONER

## 2017-12-05 PROCEDURE — 87400 INFLUENZA A/B EACH AG IA: CPT | Mod: 59

## 2017-12-05 PROCEDURE — 99284 EMERGENCY DEPT VISIT MOD MDM: CPT

## 2017-12-05 RX ORDER — ETODOLAC 400 MG/1
400 TABLET, FILM COATED ORAL 2 TIMES DAILY PRN
Qty: 20 TABLET | Refills: 0 | Status: SHIPPED | OUTPATIENT
Start: 2017-12-05 | End: 2018-02-05 | Stop reason: ALTCHOICE

## 2017-12-05 RX ORDER — IBUPROFEN 600 MG/1
600 TABLET ORAL
Status: COMPLETED | OUTPATIENT
Start: 2017-12-05 | End: 2017-12-05

## 2017-12-05 RX ADMIN — IBUPROFEN 600 MG: 600 TABLET, FILM COATED ORAL at 04:12

## 2017-12-05 NOTE — ED PROVIDER NOTES
"Encounter Date: 12/5/2017       History     Chief Complaint   Patient presents with    Knee Pain     Pt states has chronic left knee pain, also c/o body aches and states "I might have the flu too"      52-year-old female presents to emergency room complaining of left knee pain for over 3 years.  Patient states approximately 5 days ago the knee pain worsened.  States she is prescribed [place] by her primary care doctor for her knee pain but is out of her medications and has not wanted him this month.  Reports a history of arthritis.  States she occasionally feels a popping sensation in the left knee.  Denies any new injuries.  Patient also reports URI symptoms.  States he symptoms have been going on for approximately 2 days.  Denies any sore throat.  Reports cough and congestion and runny nose.  Denies ear pain.  Denies any fever.          Review of patient's allergies indicates:   Allergen Reactions    Corticosteroids (glucocorticoids) Palpitations    Morphine Anxiety     Past Medical History:   Diagnosis Date    Depression     Hepatitis     HBV    Hyperlipidemia     Liver disease stage 2 liver disease    Sleep apnea      Past Surgical History:   Procedure Laterality Date    HYSTERECTOMY      LIVER BIOPSY  10/16/2014    Stage 2 fibrosis     Family History   Problem Relation Age of Onset    No Known Problems Mother     No Known Problems Father     Colon cancer Neg Hx      Social History   Substance Use Topics    Smoking status: Light Tobacco Smoker     Packs/day: 0.50     Types: Cigarettes    Smokeless tobacco: Never Used    Alcohol use No     Review of Systems   Constitutional: Negative for fever.   HENT: Positive for congestion and rhinorrhea. Negative for sore throat.    Respiratory: Positive for cough. Negative for shortness of breath.    Cardiovascular: Negative for chest pain.   Gastrointestinal: Negative for nausea.   Genitourinary: Negative for dysuria.   Musculoskeletal: Negative for back pain. "        L knee pain   Skin: Negative for rash.   Neurological: Negative for weakness.   Hematological: Does not bruise/bleed easily.   All other systems reviewed and are negative.      Physical Exam     Initial Vitals [12/05/17 1530]   BP Pulse Resp Temp SpO2   133/89 88 20 98.2 °F (36.8 °C) 98 %      MAP       103.67         Physical Exam    Nursing note and vitals reviewed.  Constitutional: She appears well-developed and well-nourished. No distress.   HENT:   Head: Normocephalic.   Nose: Rhinorrhea present.   Mouth/Throat: Uvula is midline and oropharynx is clear and moist.   Eyes: Conjunctivae are normal.   Neck: Normal range of motion. Neck supple.   Cardiovascular: Normal rate.   Pulmonary/Chest: Breath sounds normal. No respiratory distress.   Abdominal: She exhibits no distension and no abdominal bruit. No hernia.   Neurological: She is alert and oriented to person, place, and time.   Skin: Skin is warm and dry. Capillary refill takes less than 2 seconds.   Psychiatric: She has a normal mood and affect. Her behavior is normal. Judgment and thought content normal.         ED Course   Procedures  Labs Reviewed   INFLUENZA A AND B ANTIGEN     Imaging Results          X-Ray Knee 1 or 2 View Left (Final result)  Result time 12/05/17 16:39:42    Final result by Forrest Magdaleno MD (12/05/17 16:39:42)                 Impression:      No acute findings.      Electronically signed by: FORREST MAGDALENO MD  Date:     12/05/17  Time:    16:39              Narrative:    Exam: XR KNEE 1 OR 2 VIEW LEFT,    Date:  12/05/17 16:27:00    History: Left knee joint pain.    Comparison:  No prior relevant studies available    Findings: 2 view left knee x-ray.  Mild medial joint space narrowing.  No fracture, dislocation or joint effusion.                                      Medical Decision Making:   Initial Assessment:   52-year-old female presents to emergency room complaining of left knee pain for over 3 years.  Patient  "states approximately 5 days ago the knee pain worsened.  States she is prescribed [place] by her primary care doctor for her knee pain but is out of her medications and has not wanted him this month.  Reports a history of arthritis.  States she occasionally feels a popping sensation in the left knee.  Denies any new injuries.  Patient also reports URI symptoms.  States he symptoms have been going on for approximately 2 days.  Denies any sore throat.  Reports cough and congestion and runny nose.  Denies ear pain.  Denies any fever.  Left knee is normal exam.  Able to demonstrate full range of motion of knee.  No swelling to the area.  Patient reports "pain on both sides of the knee".  No erythema to the area.  Lungs are clear bilaterally.  Posterior pharynx unremarkable.  TMs are normal.  Differential Diagnosis:   URI, viral syndrome, RSV, pneumonia, bronchitis, croup, GERD, influenza, strep throat  Contusion, fracture, arthritis, DJD, ligament injury, meniscal injury  Clinical Tests:   Lab Tests: Ordered and Reviewed  Radiological Study: Ordered and Reviewed  ED Management:  X-ray is unremarkable.  Flu swab was negative.  I'll treat the patient and anti-inflammatories.  Instructed to follow with her primary care doctor regarding chronic knee pain.                   ED Course      Clinical Impression:   The primary encounter diagnosis was Chronic pain of left knee. A diagnosis of Upper respiratory tract infection, unspecified type was also pertinent to this visit.                           Alyse Miguel NP  12/13/17 2238       Mike Earl MD  12/31/17 0706    "

## 2018-01-19 ENCOUNTER — HOSPITAL ENCOUNTER (EMERGENCY)
Facility: HOSPITAL | Age: 53
Discharge: HOME | End: 2018-01-19
Attending: FAMILY MEDICINE
Payer: MEDICAID

## 2018-01-19 VITALS
HEART RATE: 78 BPM | BODY MASS INDEX: 32.14 KG/M2 | WEIGHT: 200 LBS | TEMPERATURE: 98 F | HEIGHT: 66 IN | SYSTOLIC BLOOD PRESSURE: 135 MMHG | RESPIRATION RATE: 20 BRPM | DIASTOLIC BLOOD PRESSURE: 83 MMHG | OXYGEN SATURATION: 100 %

## 2018-01-19 DIAGNOSIS — R07.9 CHEST PAIN: Primary | ICD-10-CM

## 2018-01-19 DIAGNOSIS — R05.9 COUGH: ICD-10-CM

## 2018-01-19 DIAGNOSIS — I51.7 MILD CARDIOMEGALY: ICD-10-CM

## 2018-01-19 LAB
ALBUMIN SERPL BCP-MCNC: 4 G/DL
ALP SERPL-CCNC: 97 U/L
ALT SERPL W/O P-5'-P-CCNC: 47 U/L
ANION GAP SERPL CALC-SCNC: 8 MMOL/L
AST SERPL-CCNC: 25 U/L
BASOPHILS # BLD AUTO: 0.02 K/UL
BASOPHILS NFR BLD: 0.3 %
BILIRUB SERPL-MCNC: 0.3 MG/DL
BUN SERPL-MCNC: 10 MG/DL
CALCIUM SERPL-MCNC: 9.2 MG/DL
CHLORIDE SERPL-SCNC: 104 MMOL/L
CO2 SERPL-SCNC: 29 MMOL/L
CREAT SERPL-MCNC: 0.69 MG/DL
DIFFERENTIAL METHOD: ABNORMAL
EOSINOPHIL # BLD AUTO: 0.1 K/UL
EOSINOPHIL NFR BLD: 1.8 %
ERYTHROCYTE [DISTWIDTH] IN BLOOD BY AUTOMATED COUNT: 15.9 %
EST. GFR  (AFRICAN AMERICAN): >60 ML/MIN/1.73 M^2
EST. GFR  (NON AFRICAN AMERICAN): >60 ML/MIN/1.73 M^2
GLUCOSE SERPL-MCNC: 130 MG/DL
HCT VFR BLD AUTO: 40.2 %
HGB BLD-MCNC: 12.6 G/DL
LYMPHOCYTES # BLD AUTO: 2.4 K/UL
LYMPHOCYTES NFR BLD: 39.5 %
MCH RBC QN AUTO: 25 PG
MCHC RBC AUTO-ENTMCNC: 31.3 G/DL
MCV RBC AUTO: 80 FL
MONOCYTES # BLD AUTO: 0.4 K/UL
MONOCYTES NFR BLD: 7.2 %
NEUTROPHILS # BLD AUTO: 3.1 K/UL
NEUTROPHILS NFR BLD: 51 %
NT-PROBNP: 44 PG/ML
PLATELET # BLD AUTO: 188 K/UL
PMV BLD AUTO: 11.3 FL
POTASSIUM SERPL-SCNC: 4.1 MMOL/L
PROT SERPL-MCNC: 8.2 G/DL
RBC # BLD AUTO: 5.05 M/UL
SODIUM SERPL-SCNC: 141 MMOL/L
TROPONIN I SERPL DL<=0.01 NG/ML-MCNC: <0.012 NG/ML
WBC # BLD AUTO: 6.08 K/UL

## 2018-01-19 PROCEDURE — 93005 ELECTROCARDIOGRAM TRACING: CPT

## 2018-01-19 PROCEDURE — 25000003 PHARM REV CODE 250: Performed by: FAMILY MEDICINE

## 2018-01-19 PROCEDURE — 99284 EMERGENCY DEPT VISIT MOD MDM: CPT

## 2018-01-19 PROCEDURE — 93010 ELECTROCARDIOGRAM REPORT: CPT | Mod: ,,, | Performed by: INTERNAL MEDICINE

## 2018-01-19 PROCEDURE — 83880 ASSAY OF NATRIURETIC PEPTIDE: CPT

## 2018-01-19 PROCEDURE — 84484 ASSAY OF TROPONIN QUANT: CPT

## 2018-01-19 PROCEDURE — 85025 COMPLETE CBC W/AUTO DIFF WBC: CPT

## 2018-01-19 PROCEDURE — 80053 COMPREHEN METABOLIC PANEL: CPT

## 2018-01-19 PROCEDURE — 25500020 PHARM REV CODE 255: Performed by: FAMILY MEDICINE

## 2018-01-19 RX ORDER — PROMETHAZINE HYDROCHLORIDE AND CODEINE PHOSPHATE 6.25; 1 MG/5ML; MG/5ML
5 SOLUTION ORAL EVERY 4 HOURS PRN
Qty: 120 ML | Refills: 0 | Status: SHIPPED | OUTPATIENT
Start: 2018-01-19 | End: 2018-01-29

## 2018-01-19 RX ORDER — ASPIRIN 325 MG
325 TABLET ORAL
Status: COMPLETED | OUTPATIENT
Start: 2018-01-19 | End: 2018-01-19

## 2018-01-19 RX ADMIN — ASPIRIN 325 MG ORAL TABLET 325 MG: 325 PILL ORAL at 12:01

## 2018-01-19 RX ADMIN — IOHEXOL 100 ML: 350 INJECTION, SOLUTION INTRAVENOUS at 02:01

## 2018-01-22 PROBLEM — M70.50 ANSERINE BURSITIS: Status: ACTIVE | Noted: 2018-01-22

## 2018-01-22 PROBLEM — M25.50 ARTHRALGIA: Status: ACTIVE | Noted: 2018-01-22

## 2018-01-22 NOTE — ED PROVIDER NOTES
Encounter Date: 1/19/2018       History     Chief Complaint   Patient presents with    Cough     painful productive cough x 2 weeks, dneis fever     52-year-old female presents with chief complaint of productive cough for last 2 weeks.  Patient reports that whenever she takes a deep breath and has pain bilaterally.  Patient also complains of feeling short of breath especially when she exerts himself.  Denies any fever but does report the chest pain is 8-9 on a 10 in intensity.  Denies any sweating with the pain.  Denies having similar complaints in the past.          Review of patient's allergies indicates:   Allergen Reactions    Corticosteroids (glucocorticoids) Palpitations    Morphine Anxiety     Past Medical History:   Diagnosis Date    Depression     Hepatitis     HBV    Hyperlipidemia     Liver disease stage 2 liver disease    Sleep apnea      Past Surgical History:   Procedure Laterality Date    HYSTERECTOMY      LIVER BIOPSY  10/16/2014    Stage 2 fibrosis     Family History   Problem Relation Age of Onset    No Known Problems Mother     No Known Problems Father     Colon cancer Neg Hx      Social History   Substance Use Topics    Smoking status: Light Tobacco Smoker     Packs/day: 0.50     Types: Cigarettes    Smokeless tobacco: Never Used    Alcohol use No     Review of Systems   Constitutional: Negative for chills and fever.   HENT: Negative for congestion.    Respiratory: Positive for cough and shortness of breath.    Cardiovascular: Positive for chest pain.   Gastrointestinal: Negative for abdominal pain.   Musculoskeletal: Negative for gait problem.   All other systems reviewed and are negative.      Physical Exam     Initial Vitals [01/19/18 1144]   BP Pulse Resp Temp SpO2   (!) 149/78 86 18 98.2 °F (36.8 °C) 97 %      MAP       101.67         Physical Exam    Nursing note and vitals reviewed.  Constitutional: She appears well-developed and well-nourished.   HENT:   Head:  Normocephalic and atraumatic.   Eyes: Conjunctivae and EOM are normal. Pupils are equal, round, and reactive to light.   Neck: Normal range of motion. Neck supple.   Cardiovascular: Normal rate, regular rhythm, normal heart sounds and intact distal pulses.   Pulmonary/Chest: Breath sounds normal. She exhibits no tenderness.   Abdominal: Soft. Bowel sounds are normal.   Musculoskeletal: Normal range of motion.   Neurological: She is alert and oriented to person, place, and time. She has normal strength.   Skin: Skin is warm. Capillary refill takes less than 2 seconds.   Psychiatric: Her behavior is normal. She exhibits a depressed mood.         ED Course   Procedures  Labs Reviewed   CBC W/ AUTO DIFFERENTIAL - Abnormal; Notable for the following:        Result Value    MCV 80 (*)     MCH 25.0 (*)     MCHC 31.3 (*)     RDW 15.9 (*)     All other components within normal limits   COMPREHENSIVE METABOLIC PANEL - Abnormal; Notable for the following:     Glucose 130 (*)     ALT 47 (*)     All other components within normal limits   TROPONIN I   NT-PRO NATRIURETIC PEPTIDE        ECG Results          EKG 12-lead (Final result)  Result time 01/19/18 15:56:44    Final result by Henrietta, [facility] (01/19/18 15:56:44)                 Narrative:    Test Reason : R07.9  Blood Pressure :  mmHG  Vent. Rate : 077 BPM     Atrial Rate : 077 BPM     P-R Int : 156 ms          QRS Dur : 088 ms      QT Int : 398 ms       P-R-T Axes : 048 039 041 degrees     QTc Int : 450 ms    Normal sinus rhythm  Nonspecific T wave abnormality  Abnormal ECG  When compared with ECG of 24-JUL-2015 10:25,  No significant change was found  Confirmed by Mehul Fitzgerald MD ([id number]) on 1/19/2018 3:56:30 PM    Referred By: VALERIE MUHAMMAD           Confirmed By:Mehul Fitzgerald MD                                         Imaging Results          CTA Chest Non-Coronary (PE Study) (Final result)  Result time 01/19/18 15:03:43    Final result by Jacoby GAVIN  MD [place] (01/19/18 15:03:43)                 Impression:         Negative CTA of the chest. No evidence of pulmonary embolism.        All CT scans at this facility use dose modulation, iterative reconstruction and/or weight based dosing when appropriate to reduce radiation dose to as low as reasonably achievable.       Electronically signed by: FORREST MAGDALENO MD  Date:     01/19/18  Time:    15:03              Narrative:    Procedure: CTA CHEST NON CORONARY, Friday, January 19, 2018    Clinical history: Shortness of breath. chest pain    Technique: Standard CTA of the chest performed with 100 cc Omnipaque 350 utilizing 3-D MIP reformations.    Findings: The lung fields are clear with minor subpleural atelectasis.    Cardiac size is normal.  Thoracic aorta reveals minor calcification    The pulmonary arteries demonstrate normal enhancement with no definite filling defect.  No pleural effusion or pneumothorax.    Upper abdominal images are negative.                             X-Ray Chest PA And Lateral (Final result)  Result time 01/19/18 12:18:14    Final result by JAMIE Eisenberg Sr., MD (01/19/18 12:18:14)                 Impression:        1. The lungs are clear.  2. There is mild cardiomegaly..      Electronically signed by: AJMIE EISENBERG MD  Date:     01/19/18  Time:    12:18              Narrative:    Two-view chest x-ray    Clinical History:  Cough    Finding: Comparison was made to a prior examination performed on 5/18/2017. There is mild cardiomegaly. The lungs are clear. There is no pneumothorax or pleural effusion.                                advised the patient to follow-up with cardiology in light of mild cardiomegaly noted        ED Course      Clinical Impression:   The primary encounter diagnosis was Chest pain. Diagnoses of Cough and Mild cardiomegaly were also pertinent to this visit.                           Mike Earl MD  01/22/18 0749

## 2018-02-05 ENCOUNTER — OFFICE VISIT (OUTPATIENT)
Dept: CARDIOLOGY | Facility: CLINIC | Age: 53
End: 2018-02-05
Payer: MEDICAID

## 2018-02-05 VITALS
SYSTOLIC BLOOD PRESSURE: 120 MMHG | WEIGHT: 209.63 LBS | BODY MASS INDEX: 34.93 KG/M2 | DIASTOLIC BLOOD PRESSURE: 80 MMHG | HEIGHT: 65 IN | OXYGEN SATURATION: 97 % | HEART RATE: 78 BPM

## 2018-02-05 DIAGNOSIS — E66.9 OBESITY (BMI 30.0-34.9): ICD-10-CM

## 2018-02-05 DIAGNOSIS — R07.89 OTHER CHEST PAIN: Primary | ICD-10-CM

## 2018-02-05 DIAGNOSIS — F17.200 SMOKING: ICD-10-CM

## 2018-02-05 DIAGNOSIS — E78.2 MIXED HYPERLIPIDEMIA: ICD-10-CM

## 2018-02-05 PROCEDURE — 99215 OFFICE O/P EST HI 40 MIN: CPT | Mod: PBBFAC,PN | Performed by: INTERNAL MEDICINE

## 2018-02-05 PROCEDURE — 99205 OFFICE O/P NEW HI 60 MIN: CPT | Mod: S$PBB,,, | Performed by: INTERNAL MEDICINE

## 2018-02-05 PROCEDURE — 3008F BODY MASS INDEX DOCD: CPT | Mod: ,,, | Performed by: INTERNAL MEDICINE

## 2018-02-05 PROCEDURE — 99999 PR PBB SHADOW E&M-EST. PATIENT-LVL V: CPT | Mod: PBBFAC,,, | Performed by: INTERNAL MEDICINE

## 2018-02-05 NOTE — LETTER
February 6, 2018      Abby Decker MD  [address]  [address]  [facility] [place] [address]           [place] - Cardiology  [address]  [address]  [place] [address]  Phone: [phone]  Fax: [phone]          Patient: Madison Hill   MR Number: [id number]   YOB: 1965   Date of Visit: 2/5/2018       Dear Dr. Abby Decker:    Thank you for referring Madison Hill to me for evaluation. Attached you will find relevant portions of my assessment and plan of care.    If you have questions, please do not hesitate to call me. I look forward to following Madison Hill along with you.    Sincerely,    Jacoby Philip MD    Enclosure  CC:  No Recipients    If you would like to receive this communication electronically, please contact [email or web address] or [phone] to request more information on [employer] access.    For providers and/or their staff who would like to refer a patient to [facility], please contact us through our one-stop-shop provider referral line, [facility], at [phone].    If you feel you have received this communication in error or would no longer like to receive these types of communications, please e-mail [email or web address]

## 2018-02-05 NOTE — PATIENT INSTRUCTIONS
PET stress test  Echo  Refer to nutrition for assistance with weight loss  Refer to smoking cessation  Follow up in 3 weeks

## 2018-02-05 NOTE — PROGRESS NOTES
"[facility]    CC:   Chief Complaint   Patient presents with    Chest Pain      Cardiomegaly       Patient ID: Madison Hill is a 52 y.o. female with a past medical history of Hepatitis B, HLD, smoking, who presents for follow up after recent ED evaluation for chest pain.  Pertinent history/events are as follows:     -Pt presented to [facility] on with complaints of chest pain, DELGADILLO and cough.  CTA chest was negative for PE.  CXR showed clear lungs and mild cardiomegaly.  Troponin negative and BNP within normal limits.  EKG showed normal sinus rhythm with non-specific ST/T wave changes.  Pt instructed to follow up with cardiology as outpatient, given the cardiomegaly noted on CXR.      HPI:  Ms. Hill states she had chest pains for several years.  She doesn't remember the first time she noticed chest pains.  Now has chest pain "almost every day".  Pain described as "pressure" at times.  At other times, feels "like striking pain".  Chest pain is localized to mid chest and "moves all over".  Occurs at rest and on exertion, 7/10 in intensity, lasting at least 2 hours.  Chest pain is sometimes associated with nausea and diaphoresis.  Smokes 1/2 pack a day for past 34 years.  Pt has no family history of CAD/MI or sudden cardiac death.  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  She currently does not work.     Past Medical History:   Diagnosis Date    Depression     Hepatitis     HBV    Hyperlipidemia     Liver disease stage 2 liver disease    Sleep apnea      Past Surgical History:   Procedure Laterality Date    HYSTERECTOMY      LIVER BIOPSY  10/16/2014    Stage 2 fibrosis     Social History     Social History    Marital status: Single     Spouse name: N/A    Number of children: N/A    Years of education: N/A     Occupational History    Not on file.     Social History Main Topics    Smoking status: Light Tobacco Smoker     Packs/day: 0.50     Types: Cigarettes    " Smokeless tobacco: Never Used    Alcohol use No    Drug use: Yes     Types: Cocaine    Sexual activity: No     Other Topics Concern    Not on file     Social History Narrative    No narrative on file     Family History   Problem Relation Age of Onset    No Known Problems Mother     No Known Problems Father     Colon cancer Neg Hx        Review of patient's allergies indicates:   Allergen Reactions    Corticosteroids (glucocorticoids) Palpitations    Morphine Anxiety       Medication List with Changes/Refills   Current Medications    ARIPIPRAZOLE (ABILIFY) 10 MG DISINTEGRATING TABLET    Take 10 mg by mouth once daily.    ARIPIPRAZOLE (ABILIFY) 5 MG TAB        ATORVASTATIN (LIPITOR) 40 MG TABLET    Take 40 mg by mouth once daily.     CITALOPRAM (CELEXA) 20 MG TABLET    Take 20 mg by mouth once daily.     CYCLOBENZAPRINE (FLEXERIL) 10 MG TABLET        FLUVIRIN 2003-1821, PF, 45 MCG (15 MCG X 3)/0.5 ML SYRG        LAXATIVE, BISACODYL, 5 MG EC TABLET        MOVANTIK TABLET        PANTOPRAZOLE (PROTONIX) 20 MG TABLET    Take 1 tablet (20 mg total) by mouth once daily.    TRAMADOL (ULTRAM) 50 MG TABLET    TAKE ONE TO TWO TABLETS BY MOUTH EVERY SIX HOURS AS NEEDED FOR PAIN   Discontinued Medications    ETODOLAC (LODINE) 400 MG TABLET    Take 1 tablet (400 mg total) by mouth 2 (two) times daily as needed (pain).    FLUCONAZOLE (DIFLUCAN) 150 MG TAB    TAKE 1 TABLET BY MOUTH ONCE AS DIRECTED    HYDROCODONE-ACETAMINOPHEN 10-325MG (NORCO)  MG TAB           Review of Systems  Constitution: Denies chills, fever, and sweats.  HENT: Denies headaches or blurry vision.  Cardiovascular: Positive for chest pain.    Respiratory: Denies cough or shortness of breath.  Gastrointestinal: Denies abdominal pain, nausea, or vomiting.  Musculoskeletal: Denies muscle cramps.  Neurological: Denies dizziness or focal weakness.  Psychiatric/Behavioral: Normal mental status.  Hematologic/Lymphatic: Denies bleeding problem or easy  "bruising/bleeding.  Skin: Denies rash or suspicious lesions    Physical Examination  /80 (BP Location: Right arm, Patient Position: Sitting, BP Method: Large (Manual))   Pulse 78   Ht 5' 5" (1.651 m)   Wt 95.1 kg (209 lb 9.6 oz)   SpO2 97%   BMI 34.88 kg/m²     Constitutional: No acute distress, conversant  HEENT: Sclera anicteric, Pupils equal, round and reactive to light, extraocular motions intact, Oropharynx clear  Neck: No JVD, no carotid bruits  Cardiovascular: regular rate and rhythm, no murmur, rubs or gallops, normal S1/S2  Pulmonary: Clear to auscultation bilaterally  Abdominal: Abdomen soft, nontender, nondistended, positive bowel sounds  Extremities: No lower extremity edema,   Pulses:  Carotid pulses are 2+ on the right side, and 2+ on the left side.  Radial pulses are 2+ on the right side, and 2+ on the left side.   Femoral pulses are 2+ on the right side, and 2+ on the left side.  Skin: No ecchymosis, erythema, or ulcers  Psych: Alert and oriented x 3, appropriate affect  Neuro: CNII-XII intact, no focal deficits    Labs:  Most Recent Data  CBC:   Lab Results   Component Value Date    WBC 6.08 01/19/2018    HGB 12.6 01/19/2018    HCT 40.2 01/19/2018     01/19/2018    MCV 80 (L) 01/19/2018    RDW 15.9 (H) 01/19/2018     BMP:   Lab Results   Component Value Date     01/19/2018    K 4.1 01/19/2018     01/19/2018    CO2 29 01/19/2018    BUN 10 01/19/2018    CREATININE 0.69 01/19/2018     (H) 01/19/2018    CALCIUM 9.2 01/19/2018     LFTS;   Lab Results   Component Value Date    PROT 8.2 01/19/2018    ALBUMIN 4.0 01/19/2018    BILITOT 0.3 01/19/2018    AST 25 01/19/2018    ALKPHOS 97 01/19/2018    ALT 47 (H) 01/19/2018     COAGS:   Lab Results   Component Value Date    INR 1.0 10/07/2014     FLP:   Lab Results   Component Value Date    CHOL 288 (H) 08/15/2017    HDL 43 08/15/2017    LDLCALC 211.2 (H) 08/15/2017    TRIG 169 (H) 08/15/2017    CHOLHDL 14.9 (L) 08/15/2017 "     CARDIAC:   Lab Results   Component Value Date    TROPONINI <0.012 01/19/2018       EKG 1/19/2018:  Normal sinus rhythm  Nonspecific T wave abnormality    Assessment/Plan:    1. Chest Pain- Pt presents with chest pain as described above.  Features are largely atypical, but pt has risk factors for CAD, including smoking and obesity with LDL of 211. Ten ASCVD risk is 6.4%.  Check PET stress test and echo to evaluate further.  Start ASA 81 mg daily.  Increase atorvastatin to 80 mg daily.      2. Smoking- Pt states she's ready to quit.  Refer to smoking cessation.     3. Obesity- Refer to nutrition for assistance with weight loss.  Monitor BMI.    Follow up in 3 weeks     Total duration of face to face visit time 30 minutes.  Total time spent counseling greater than fifty percent of total visit time.  Counseling included discussion regarding imaging findings, diagnosis, possibilities, treatment options, risks and benefits.  The patient had many questions regarding the options and long-term effects.    Jacoby Philip MD, PhD  Interventional Cardiology

## 2018-02-06 ENCOUNTER — TELEPHONE (OUTPATIENT)
Dept: CARDIOLOGY | Facility: CLINIC | Age: 53
End: 2018-02-06

## 2018-02-06 RX ORDER — ATORVASTATIN CALCIUM 80 MG/1
80 TABLET, FILM COATED ORAL DAILY
Qty: 90 TABLET | Refills: 3 | Status: SHIPPED | OUTPATIENT
Start: 2018-02-06 | End: 2020-12-29

## 2018-02-06 NOTE — TELEPHONE ENCOUNTER
----- Message from Silvia Saldaña sent at 2/6/2018  1:07 PM CST -----  Contact: [phone]/self  Patient states she is returning your call. Please advise.

## 2018-02-15 ENCOUNTER — TELEPHONE (OUTPATIENT)
Dept: CARDIOLOGY | Facility: CLINIC | Age: 53
End: 2018-02-15

## 2018-02-15 NOTE — TELEPHONE ENCOUNTER
----- Message from David Moon sent at 2/15/2018 12:17 PM CST -----  Contact: Arnoldo SWANSON from [facility] [employer]/[phone], ext. [phone]  Arnoldo would like a call back to schedule a peer to peer with the doctor.

## 2018-02-15 NOTE — TELEPHONE ENCOUNTER
----- Message from Monse Phillips sent at 2/15/2018  9:35 AM CST -----  Contact: self/[phone] x [phone]  She needs to schedule peer to peer; she is returning Kaylie's call.

## 2018-02-19 DIAGNOSIS — R07.9 CHEST PAIN, UNSPECIFIED TYPE: Primary | ICD-10-CM

## 2018-02-23 ENCOUNTER — TELEPHONE (OUTPATIENT)
Dept: CARDIOLOGY | Facility: CLINIC | Age: 53
End: 2018-02-23

## 2018-02-26 ENCOUNTER — CLINICAL SUPPORT (OUTPATIENT)
Dept: CARDIOLOGY | Facility: CLINIC | Age: 53
End: 2018-02-26
Attending: INTERNAL MEDICINE
Payer: MEDICAID

## 2018-02-26 DIAGNOSIS — R07.9 CHEST PAIN, UNSPECIFIED TYPE: ICD-10-CM

## 2018-02-26 PROCEDURE — 93016 CV STRESS TEST SUPVJ ONLY: CPT | Mod: S$PBB,,, | Performed by: INTERNAL MEDICINE

## 2018-02-26 PROCEDURE — 93018 CV STRESS TEST I&R ONLY: CPT | Mod: S$PBB,,, | Performed by: INTERNAL MEDICINE

## 2018-02-26 PROCEDURE — 78452 HT MUSCLE IMAGE SPECT MULT: CPT | Mod: PBBFAC | Performed by: INTERNAL MEDICINE

## 2018-03-02 ENCOUNTER — OFFICE VISIT (OUTPATIENT)
Dept: CARDIOLOGY | Facility: CLINIC | Age: 53
End: 2018-03-02
Payer: MEDICAID

## 2018-03-02 VITALS
WEIGHT: 211.88 LBS | DIASTOLIC BLOOD PRESSURE: 80 MMHG | OXYGEN SATURATION: 98 % | BODY MASS INDEX: 35.3 KG/M2 | SYSTOLIC BLOOD PRESSURE: 128 MMHG | HEART RATE: 73 BPM | HEIGHT: 65 IN

## 2018-03-02 DIAGNOSIS — E78.2 MIXED HYPERLIPIDEMIA: Primary | ICD-10-CM

## 2018-03-02 DIAGNOSIS — F17.200 SMOKING: ICD-10-CM

## 2018-03-02 DIAGNOSIS — R07.89 OTHER CHEST PAIN: ICD-10-CM

## 2018-03-02 DIAGNOSIS — E66.9 OBESITY (BMI 30.0-34.9): ICD-10-CM

## 2018-03-02 PROCEDURE — 99214 OFFICE O/P EST MOD 30 MIN: CPT | Mod: PBBFAC,PN | Performed by: INTERNAL MEDICINE

## 2018-03-02 PROCEDURE — 99215 OFFICE O/P EST HI 40 MIN: CPT | Mod: S$PBB,,, | Performed by: INTERNAL MEDICINE

## 2018-03-02 PROCEDURE — 99999 PR PBB SHADOW E&M-EST. PATIENT-LVL IV: CPT | Mod: PBBFAC,,, | Performed by: INTERNAL MEDICINE

## 2018-03-02 NOTE — PROGRESS NOTES
"[facility]    CC:   Chief Complaint   Patient presents with    Results     ECHO/NUC       Patient ID: Madison Hill is a 52 y.o. female with a past medical history of Hepatitis B, HLD, smoking, who presents for follow up evaluation for chest pain.  Pertinent history/events are as follows:     -Pt presented to [facility] on with complaints of chest pain, DELGADILLO and cough.  CTA chest was negative for PE.  CXR showed clear lungs and mild cardiomegaly.  Troponin negative and BNP within normal limits.  EKG showed normal sinus rhythm with non-specific ST/T wave changes.  Pt instructed to follow up with cardiology as outpatient, given the cardiomegaly noted on CXR.      -At our initial clinic on 2/5/2018, Ms. Hill stated she had chest pains for several years.  She doesn't remember the first time she noticed chest pains.  Now has chest pain "almost every day".  Pain described as "pressure" at times.  At other times, feels "like striking pain".  Chest pain is localized to mid chest and "moves all over".  Occurs at rest and on exertion, 7/10 in intensity, lasting at least 2 hours.  Chest pain is sometimes associated with nausea and diaphoresis.  Smokes 1/2 pack a day for past 34 years.  Pt has no family history of CAD/MI or sudden cardiac death.  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  She currently does not work.    HPI:   Ms. Hill states she's been doing well since our initial clinic visit on 2/5/2018.  Still has chest pain "off and on".  Pt has upcoming appointment with smoking cessation.  Pt did not keep her appointment with nutrition for assistance with weight loss.  Echo from 2/21/2018 shows concentric remodeling; normal left ventricular systolic function (EF 65-70%); normal left ventricular diastolic function; no significant valvular abnormalities.  Nuclear stress test from shows normal myocardial perfusion with no evidence for myocardial ischemia or injury. Sensitivity is " impaired due to failure to reach target heart rate.      Past Medical History:   Diagnosis Date    Depression     Hepatitis     HBV    Hyperlipidemia     Liver disease stage 2 liver disease    Sleep apnea      Past Surgical History:   Procedure Laterality Date    HYSTERECTOMY      LIVER BIOPSY  10/16/2014    Stage 2 fibrosis     Social History     Social History    Marital status: Single     Spouse name: N/A    Number of children: N/A    Years of education: N/A     Occupational History    Not on file.     Social History Main Topics    Smoking status: Light Tobacco Smoker     Packs/day: 0.50     Types: Cigarettes    Smokeless tobacco: Never Used    Alcohol use No    Drug use: Yes     Types: Cocaine    Sexual activity: No     Other Topics Concern    Not on file     Social History Narrative    No narrative on file     Family History   Problem Relation Age of Onset    No Known Problems Mother     No Known Problems Father     Colon cancer Neg Hx        Review of patient's allergies indicates:   Allergen Reactions    Corticosteroids (glucocorticoids) Palpitations    Morphine Anxiety       Medication List with Changes/Refills   Current Medications    ARIPIPRAZOLE (ABILIFY) 10 MG DISINTEGRATING TABLET    Take 10 mg by mouth once daily.    ATORVASTATIN (LIPITOR) 80 MG TABLET    Take 1 tablet (80 mg total) by mouth once daily.    CITALOPRAM (CELEXA) 20 MG TABLET    Take 20 mg by mouth once daily.     CYCLOBENZAPRINE (FLEXERIL) 10 MG TABLET        FLUVIRIN 9893-2143, PF, 45 MCG (15 MCG X 3)/0.5 ML SYRG        LAXATIVE, BISACODYL, 5 MG EC TABLET        MOVANTIK TABLET        PANTOPRAZOLE (PROTONIX) 20 MG TABLET    Take 1 tablet (20 mg total) by mouth once daily.    TRAMADOL (ULTRAM) 50 MG TABLET    TAKE ONE TO TWO TABLETS BY MOUTH EVERY SIX HOURS AS NEEDED FOR PAIN   Discontinued Medications    ARIPIPRAZOLE (ABILIFY) 5 MG TAB           Review of Systems  Constitution: Denies chills, fever, and  "sweats.  HENT: Denies headaches or blurry vision.  Cardiovascular: Positive for chest pain.    Respiratory: Denies cough or shortness of breath.  Gastrointestinal: Denies abdominal pain, nausea, or vomiting.  Musculoskeletal: Denies muscle cramps.  Neurological: Denies dizziness or focal weakness.  Psychiatric/Behavioral: Normal mental status.  Hematologic/Lymphatic: Denies bleeding problem or easy bruising/bleeding.  Skin: Denies rash or suspicious lesions    Physical Examination  /80 (BP Location: Left arm, Patient Position: Sitting, BP Method: Large (Manual))   Pulse 73   Ht 5' 5" (1.651 m)   Wt 96.1 kg (211 lb 13.8 oz)   SpO2 98%   BMI 35.26 kg/m²     Constitutional: No acute distress, conversant  HEENT: Sclera anicteric, Pupils equal, round and reactive to light, extraocular motions intact, Oropharynx clear  Neck: No JVD, no carotid bruits  Cardiovascular: regular rate and rhythm, no murmur, rubs or gallops, normal S1/S2  Pulmonary: Clear to auscultation bilaterally  Abdominal: Abdomen soft, nontender, nondistended, positive bowel sounds  Extremities: No lower extremity edema,   Pulses:  Carotid pulses are 2+ on the right side, and 2+ on the left side.  Radial pulses are 2+ on the right side, and 2+ on the left side.   Femoral pulses are 2+ on the right side, and 2+ on the left side.  Skin: No ecchymosis, erythema, or ulcers  Psych: Alert and oriented x 3, appropriate affect  Neuro: CNII-XII intact, no focal deficits    Labs:  Most Recent Data  CBC:   Lab Results   Component Value Date    WBC 6.08 01/19/2018    HGB 12.6 01/19/2018    HCT 40.2 01/19/2018     01/19/2018    MCV 80 (L) 01/19/2018    RDW 15.9 (H) 01/19/2018     BMP:   Lab Results   Component Value Date     01/19/2018    K 4.1 01/19/2018     01/19/2018    CO2 29 01/19/2018    BUN 10 01/19/2018    CREATININE 0.69 01/19/2018     (H) 01/19/2018    CALCIUM 9.2 01/19/2018     LFTS;   Lab Results   Component Value Date "    PROT 8.2 01/19/2018    ALBUMIN 4.0 01/19/2018    BILITOT 0.3 01/19/2018    AST 25 01/19/2018    ALKPHOS 97 01/19/2018    ALT 47 (H) 01/19/2018     COAGS:   Lab Results   Component Value Date    INR 1.0 10/07/2014     FLP:   Lab Results   Component Value Date    CHOL 288 (H) 08/15/2017    HDL 43 08/15/2017    LDLCALC 211.2 (H) 08/15/2017    TRIG 169 (H) 08/15/2017    CHOLHDL 14.9 (L) 08/15/2017     CARDIAC:   Lab Results   Component Value Date    TROPONINI <0.012 01/19/2018       EKG 1/19/2018:  Normal sinus rhythm  Nonspecific T wave abnormality    Echo 2/21/2018:  CONCLUSIONS     1 - Concentric remodeling.     2 - No wall motion abnormalities.     3 - Normal left ventricular systolic function (EF 65-70%).     4 - Normal left ventricular diastolic function.     5 - Normal right ventricular systolic function .     Nuclear Stress Test 2/26/2018:  Nuclear Quantitative Functional Analysis:   LVEF: >= 70 % (normal is >= 51%)  LVED Volume: 81 ml (normal is <=171)  LVES Volume: 8 ml (normal is <=70)    Impression: NORMAL MYOCARDIAL PERFUSION  1. The perfusion scan is free of evidence for myocardial ischemia or injury. Sensitivity is impaired due to failure to reach target heart rate.   2. Resting wall motion is physiologic.   3. Resting LV function is normal.  (normal is >= 51%)  4. The ventricular volumes are normal at rest and stress.   5. The extracardiac distribution of radioactivity is normal.   6. There was no previous study available to compare.    Assessment/Plan:  Madison Hill is a 52 y.o. female with a past medical history of Hepatitis B, HLD, smoking, who presents for follow up evaluation for chest pain.    1. Chest Pain- Features are largely atypical, but pt has risk factors for CAD, including smoking and obesity with LDL of 211. Ten ASCVD risk is 6.4%.  Echo from 2/21/2018 shows concentric remodeling; normal left ventricular systolic function (EF 65-70%); normal left ventricular diastolic function; no  significant valvular abnormalities.  Nuclear stress test from shows normal myocardial perfusion with no evidence for myocardial ischemia or injury. Sensitivity is impaired due to failure to reach target heart rate.  Continue ASA 81 mg daily and atorvastatin to 80 mg daily.      2. Smoking- Pt has upcoming appointment with smoking cessation.  Continue to monitor.      3. Obesity- Refer back to nutrition for assistance with weight loss.  Monitor BMI.    4. HLD- Continue atorvastatin 80 mg daily.      Follow up in 3 months with lipids prior    Total duration of face to face visit time 30 minutes.  Total time spent counseling greater than fifty percent of total visit time.  Counseling included discussion regarding imaging findings, diagnosis, possibilities, treatment options, risks and benefits.  The patient had many questions regarding the options and long-term effects.    Jacoby Philip MD, PhD  Interventional Cardiology

## 2018-03-02 NOTE — PATIENT INSTRUCTIONS
Continue current medication regimen  Refer back to nutriton  Follow up in 3 months with lipids, cmp prior

## 2018-03-22 ENCOUNTER — HOSPITAL ENCOUNTER (EMERGENCY)
Facility: HOSPITAL | Age: 53
Discharge: HOME | End: 2018-03-22
Payer: MEDICAID

## 2018-03-22 VITALS
WEIGHT: 200 LBS | SYSTOLIC BLOOD PRESSURE: 139 MMHG | OXYGEN SATURATION: 99 % | BODY MASS INDEX: 33.28 KG/M2 | RESPIRATION RATE: 20 BRPM | HEART RATE: 92 BPM | DIASTOLIC BLOOD PRESSURE: 88 MMHG | TEMPERATURE: 98 F

## 2018-03-22 DIAGNOSIS — J06.9 UPPER RESPIRATORY TRACT INFECTION, UNSPECIFIED TYPE: Primary | ICD-10-CM

## 2018-03-22 PROCEDURE — 99283 EMERGENCY DEPT VISIT LOW MDM: CPT

## 2018-03-22 RX ORDER — GUAIFENESIN, PSEUDOEPHEDRINE HYDROCHLORIDE 600; 60 MG/1; MG/1
1 TABLET, EXTENDED RELEASE ORAL 2 TIMES DAILY
Qty: 20 TABLET | Refills: 0 | Status: SHIPPED | OUTPATIENT
Start: 2018-03-22 | End: 2018-04-01

## 2018-03-22 RX ORDER — DEXTROMETHORPHAN POLISTIREX 30 MG/5ML
60 SUSPENSION ORAL 2 TIMES DAILY
Qty: 150 ML | Refills: 0 | Status: SHIPPED | OUTPATIENT
Start: 2018-03-22 | End: 2018-04-01

## 2018-03-22 NOTE — ED PROVIDER NOTES
Encounter Date: 3/22/2018       History     Chief Complaint   Patient presents with    Cough     states cough productive green sputun, with body aches for 4 days    Generalized Body Aches     52-year-old female presents to emergency room complaining of cough, body aches, sputum production for the past several days.  Has not taken any over-the-counter medications.  Denies any fever.          Review of patient's allergies indicates:   Allergen Reactions    Corticosteroids (glucocorticoids) Palpitations    Morphine Anxiety     Past Medical History:   Diagnosis Date    Depression     Hepatitis     HBV    Hyperlipidemia     Liver disease stage 2 liver disease    Sleep apnea      Past Surgical History:   Procedure Laterality Date    HYSTERECTOMY      LIVER BIOPSY  10/16/2014    Stage 2 fibrosis     Family History   Problem Relation Age of Onset    No Known Problems Mother     No Known Problems Father     Colon cancer Neg Hx      Social History   Substance Use Topics    Smoking status: Light Tobacco Smoker     Packs/day: 0.50     Types: Cigarettes    Smokeless tobacco: Never Used    Alcohol use No     Review of Systems   Constitutional: Negative for fever.   HENT: Positive for congestion and rhinorrhea. Negative for sore throat.    Respiratory: Positive for cough. Negative for shortness of breath.    Cardiovascular: Negative for chest pain.   Gastrointestinal: Negative for nausea.   Genitourinary: Negative for dysuria.   Musculoskeletal: Positive for arthralgias. Negative for back pain.   Skin: Negative for rash.   Neurological: Negative for weakness.   Hematological: Does not bruise/bleed easily.   All other systems reviewed and are negative.      Physical Exam     Initial Vitals [03/22/18 1420]   BP Pulse Resp Temp SpO2   139/88 92 20 97.7 °F (36.5 °C) 99 %      MAP       105         Physical Exam    Nursing note and vitals reviewed.  Constitutional: She appears well-developed and well-nourished. No  distress.   HENT:   Head: Normocephalic.   Rhinorrhea and congestion   Eyes: Conjunctivae are normal.   Neck: Normal range of motion. Neck supple.   Cardiovascular: Normal rate.   Pulmonary/Chest: Breath sounds normal. No respiratory distress.   Abdominal: Soft. She exhibits no distension and no abdominal bruit. There is no tenderness. No hernia.   Neurological: She is alert and oriented to person, place, and time.   Skin: Skin is warm and dry. Capillary refill takes less than 2 seconds.   Psychiatric: She has a normal mood and affect. Her behavior is normal. Judgment and thought content normal.         ED Course   Procedures  Labs Reviewed - No data to display          Medical Decision Making:   Differential Diagnosis:   URI, viral syndrome, RSV, pneumonia, bronchitis, croup, GERD, influenza, strep throat  ED Management:  Patient given medications for symptom relief.  Instructed to hydrate well.  Take Tylenol and Motrin as needed for fever.  Follow-up primary care doctor in 3-5 days.  If any symptoms worsen return to emergency room.  Patient verbalized understanding.                      Clinical Impression:   The encounter diagnosis was Upper respiratory tract infection, unspecified type.                           Alyse Miguel NP  03/30/18 1383

## 2018-06-11 ENCOUNTER — HOSPITAL ENCOUNTER (EMERGENCY)
Facility: HOSPITAL | Age: 53
Discharge: HOME | End: 2018-06-11
Attending: FAMILY MEDICINE
Payer: MEDICAID

## 2018-06-11 VITALS
TEMPERATURE: 98 F | DIASTOLIC BLOOD PRESSURE: 99 MMHG | RESPIRATION RATE: 18 BRPM | HEART RATE: 96 BPM | WEIGHT: 204 LBS | SYSTOLIC BLOOD PRESSURE: 165 MMHG | HEIGHT: 65 IN | OXYGEN SATURATION: 99 % | BODY MASS INDEX: 33.99 KG/M2

## 2018-06-11 DIAGNOSIS — S39.012A LUMBAR STRAIN, INITIAL ENCOUNTER: Primary | ICD-10-CM

## 2018-06-11 DIAGNOSIS — M54.9 BACK PAIN: ICD-10-CM

## 2018-06-11 PROCEDURE — 63600175 PHARM REV CODE 636 W HCPCS: Performed by: FAMILY MEDICINE

## 2018-06-11 PROCEDURE — 99283 EMERGENCY DEPT VISIT LOW MDM: CPT | Mod: 25

## 2018-06-11 PROCEDURE — 96372 THER/PROPH/DIAG INJ SC/IM: CPT

## 2018-06-11 RX ORDER — KETOROLAC TROMETHAMINE 30 MG/ML
60 INJECTION, SOLUTION INTRAMUSCULAR; INTRAVENOUS
Status: COMPLETED | OUTPATIENT
Start: 2018-06-11 | End: 2018-06-11

## 2018-06-11 RX ORDER — DICLOFENAC SODIUM 50 MG/1
50 TABLET, DELAYED RELEASE ORAL 2 TIMES DAILY PRN
Qty: 20 TABLET | Refills: 0 | Status: SHIPPED | OUTPATIENT
Start: 2018-06-11 | End: 2019-07-12

## 2018-06-11 RX ORDER — METHOCARBAMOL 500 MG/1
1000 TABLET, FILM COATED ORAL 3 TIMES DAILY
Qty: 60 TABLET | Refills: 0 | Status: SHIPPED | OUTPATIENT
Start: 2018-06-11 | End: 2018-06-21

## 2018-06-11 RX ORDER — ORPHENADRINE CITRATE 30 MG/ML
60 INJECTION INTRAMUSCULAR; INTRAVENOUS
Status: COMPLETED | OUTPATIENT
Start: 2018-06-11 | End: 2018-06-11

## 2018-06-11 RX ADMIN — KETOROLAC TROMETHAMINE 60 MG: 30 INJECTION, SOLUTION INTRAMUSCULAR at 03:06

## 2018-06-11 RX ADMIN — ORPHENADRINE CITRATE 60 MG: 30 INJECTION INTRAMUSCULAR; INTRAVENOUS at 03:06

## 2018-06-11 NOTE — ED PROVIDER NOTES
Encounter Date: 6/11/2018       History     Chief Complaint   Patient presents with    Back Pain     Patient complains of low back pain for last few weeks.  Denies any injury. Pain radiates to the side.  Does not go down to the legs.  Difficulty in changing in position and walking.  No saddle anesthesia and buttocks.  No numbness in the legs.  No weakness in her legs.  No bowel or bladder incontinence.  Patient has been taking over the counter pain medication but not getting better so she came to the ER for evaluation.      The history is provided by the patient.     Review of patient's allergies indicates:   Allergen Reactions    Corticosteroids (glucocorticoids) Palpitations    Morphine Anxiety     Past Medical History:   Diagnosis Date    Depression     Hepatitis     HBV    Hyperlipidemia     Liver disease stage 2 liver disease    Sleep apnea      Past Surgical History:   Procedure Laterality Date    HYSTERECTOMY      LIVER BIOPSY  10/16/2014    Stage 2 fibrosis     Family History   Problem Relation Age of Onset    No Known Problems Mother     No Known Problems Father     Colon cancer Neg Hx      Social History   Substance Use Topics    Smoking status: Light Tobacco Smoker     Packs/day: 0.50     Types: Cigarettes    Smokeless tobacco: Never Used    Alcohol use No     Review of Systems   Constitutional: Negative for activity change, appetite change and fever.   HENT: Negative for congestion, ear discharge, ear pain, sinus pain, sinus pressure and sore throat.    Eyes: Negative for pain, discharge and itching.   Respiratory: Negative for cough, shortness of breath and wheezing.    Cardiovascular: Negative for chest pain and palpitations.   Gastrointestinal: Negative for abdominal pain, nausea and vomiting.   Genitourinary: Negative for dysuria, flank pain and frequency.   Musculoskeletal: Positive for back pain and gait problem. Negative for joint swelling, myalgias and neck pain.   Neurological:  Negative for dizziness, tremors, seizures, syncope, facial asymmetry, speech difficulty, weakness, light-headedness, numbness and headaches.   Psychiatric/Behavioral: Negative for behavioral problems, confusion, decreased concentration and sleep disturbance. The patient is not nervous/anxious.    All other systems reviewed and are negative.      Physical Exam     Initial Vitals [06/11/18 1531]   BP Pulse Resp Temp SpO2   (!) 165/99 96 18 98.2 °F (36.8 °C) 99 %      MAP       --         Physical Exam    Nursing note and vitals reviewed.  Constitutional: She appears well-developed and well-nourished.   HENT:   Head: Normocephalic.   Right Ear: External ear normal.   Nose: Nose normal.   Mouth/Throat: Oropharynx is clear and moist.   Eyes: Conjunctivae and EOM are normal. Pupils are equal, round, and reactive to light.   Neck: Normal range of motion. Neck supple.   Cardiovascular: Normal rate, regular rhythm, normal heart sounds and intact distal pulses.   Pulmonary/Chest: Breath sounds normal. No respiratory distress. She has no wheezes. She has no rhonchi. She has no rales.   Abdominal: Soft. Bowel sounds are normal. She exhibits no distension. There is no tenderness. There is no guarding.   Musculoskeletal: Normal range of motion.        Lumbar back: She exhibits tenderness and pain. She exhibits normal range of motion and no bony tenderness.        Back:    Neurological: She is alert and oriented to person, place, and time. She has normal strength and normal reflexes. She displays normal reflexes. No cranial nerve deficit.   Skin: Skin is warm. Capillary refill takes less than 2 seconds. No rash noted. No erythema.   Psychiatric: She has a normal mood and affect. Thought content normal.         ED Course   Procedures  Labs Reviewed - No data to display       X-Ray Lumbar Spine Ap And Lateral    (Results Pending)        Medical Decision Making:   Initial Assessment:   Patient presents to ER with lumbar strain for  last few weeks.  Has been taking medication at home but not getting better so she came to ER.  Pain is more with walking and changing position.  Denies any injury. No saddle anesthesia or paresthesia.  Differential Diagnosis:   Lumbar radiculopathy, lumbar arthritis, spondylolisthesis, spondylosis, lumbar disc herniation, musculoskeletal pain,  Clinical Tests:   Radiological Study: Ordered and Reviewed  ED Management:  Patient lumbar x-ray showed DJD, patient is given pain medication Toradol and Flexeril in the ER.  And also a prescription.  Advised to follow up with the primary care physician if pain persist or to the ER if pain worsens.  Patient is advised to notify ER immediately in case of saddle anesthesia, sudden weakness or numbness in the legs.                      Clinical Impression:   The primary encounter diagnosis was Lumbar strain, initial encounter. A diagnosis of Back pain was also pertinent to this visit.      Disposition:   Disposition: Discharged  Condition: Stable                        Ghanshyam Goodrich MD  06/12/18 0657

## 2018-09-24 ENCOUNTER — HOSPITAL ENCOUNTER (EMERGENCY)
Facility: HOSPITAL | Age: 53
Discharge: HOME | End: 2018-09-24
Attending: FAMILY MEDICINE
Payer: MEDICAID

## 2018-09-24 VITALS
WEIGHT: 200 LBS | RESPIRATION RATE: 18 BRPM | TEMPERATURE: 98 F | SYSTOLIC BLOOD PRESSURE: 133 MMHG | BODY MASS INDEX: 33.32 KG/M2 | OXYGEN SATURATION: 100 % | HEIGHT: 65 IN | DIASTOLIC BLOOD PRESSURE: 82 MMHG | HEART RATE: 79 BPM

## 2018-09-24 DIAGNOSIS — K62.5 RECTAL BLEEDING: Primary | ICD-10-CM

## 2018-09-24 PROCEDURE — 63600175 PHARM REV CODE 636 W HCPCS: Performed by: FAMILY MEDICINE

## 2018-09-24 PROCEDURE — 96372 THER/PROPH/DIAG INJ SC/IM: CPT

## 2018-09-24 PROCEDURE — 99283 EMERGENCY DEPT VISIT LOW MDM: CPT | Mod: 25

## 2018-09-24 RX ORDER — HYDROCORTISONE 25 MG/G
CREAM TOPICAL 2 TIMES DAILY
Qty: 20 G | Refills: 0 | Status: SHIPPED | OUTPATIENT
Start: 2018-09-24 | End: 2019-07-12

## 2018-09-24 RX ORDER — KETOROLAC TROMETHAMINE 30 MG/ML
60 INJECTION, SOLUTION INTRAMUSCULAR; INTRAVENOUS
Status: COMPLETED | OUTPATIENT
Start: 2018-09-24 | End: 2018-09-24

## 2018-09-24 RX ADMIN — KETOROLAC TROMETHAMINE 60 MG: 30 INJECTION, SOLUTION INTRAMUSCULAR at 04:09

## 2018-09-24 NOTE — ED PROVIDER NOTES
Encounter Date: 9/24/2018       History     Chief Complaint   Patient presents with    Rectal Bleeding     c/o rectal bleeding with pain x 3 days     52-year-old female patient comes in with main complaint of rectal bleeding patient states she has a history of fissures and hemorrhoids otherwise patient describes a very bright red blood that is dripping into the specially after bowel.  Patient did not try any over-the-counter medicines creams          Review of patient's allergies indicates:   Allergen Reactions    Corticosteroids (glucocorticoids) Palpitations    Morphine Anxiety     Past Medical History:   Diagnosis Date    Depression     Hepatitis     HBV    Hyperlipidemia     Liver disease stage 2 liver disease    Sleep apnea      Past Surgical History:   Procedure Laterality Date    BIOPSY N/A 10/16/2014    [facility]    HYSTERECTOMY      LIVER BIOPSY  10/16/2014    Stage 2 fibrosis     Family History   Problem Relation Age of Onset    No Known Problems Mother     No Known Problems Father     Colon cancer Neg Hx      Social History     Tobacco Use    Smoking status: Light Tobacco Smoker     Packs/day: 0.50     Types: Cigarettes    Smokeless tobacco: Never Used   Substance Use Topics    Alcohol use: No     Alcohol/week: 0.0 oz    Drug use: No     Review of Systems   Constitutional: Negative for fever.   HENT: Negative for sore throat.    Respiratory: Negative for shortness of breath.    Cardiovascular: Negative for chest pain.   Gastrointestinal: Positive for anal bleeding. Negative for nausea.   Genitourinary: Negative for dysuria.   Musculoskeletal: Negative for back pain.   Skin: Negative for rash.   Neurological: Negative for weakness.   Hematological: Does not bruise/bleed easily.   All other systems reviewed and are negative.      Physical Exam     Initial Vitals [09/24/18 0420]   BP Pulse Resp Temp SpO2   (!) 145/81 96 18 98.4 °F (36.9 °C) 96 %       MAP       --         Physical Exam    Nursing note and vitals reviewed.  Constitutional: She appears well-developed.   HENT:   Head: Normocephalic and atraumatic.   Right Ear: External ear normal.   Left Ear: External ear normal.   Nose: Nose normal.   Mouth/Throat: Oropharynx is clear and moist.   Eyes: Conjunctivae and EOM are normal. Pupils are equal, round, and reactive to light. Right eye exhibits no discharge. Left eye exhibits no discharge.   Neck: Normal range of motion. Neck supple. No tracheal deviation present.   Cardiovascular: Normal rate, regular rhythm and normal heart sounds.   No murmur heard.  Pulmonary/Chest: Breath sounds normal. No respiratory distress. She has no wheezes.   Abdominal: Soft. Bowel sounds are normal.   Genitourinary:   Genitourinary Comments: With a chaperone present patient's rectal region was suspected showing large external hemorrhoids with a fever on hemorrhoids no active at the time inspection but dried blood visible.   Neurological: She is alert and oriented to person, place, and time.   Skin: Skin is warm and dry.         ED Course   Procedures  Labs Reviewed - No data to display       Imaging Results    None          Medical Decision Making:   Initial Assessment:   Patient in moderate distress and no other complains    Differential Diagnosis:   External hemorrhoids, fissure, internal hemorrhoids, rectal bleeding.                      Clinical Impression:   The encounter diagnosis was Rectal bleeding.                             Ron Luna MD  09/24/18 4159

## 2018-10-08 ENCOUNTER — HOSPITAL ENCOUNTER (EMERGENCY)
Facility: HOSPITAL | Age: 53
Discharge: PSYCHIATRIC OR SUBSTANCE USE FACILITY | End: 2018-10-08
Attending: FAMILY MEDICINE
Payer: MEDICAID

## 2018-10-08 VITALS
SYSTOLIC BLOOD PRESSURE: 115 MMHG | RESPIRATION RATE: 18 BRPM | TEMPERATURE: 99 F | BODY MASS INDEX: 31.01 KG/M2 | WEIGHT: 175 LBS | OXYGEN SATURATION: 96 % | HEART RATE: 80 BPM | HEIGHT: 63 IN | DIASTOLIC BLOOD PRESSURE: 59 MMHG

## 2018-10-08 DIAGNOSIS — F14.10 COCAINE ABUSE: ICD-10-CM

## 2018-10-08 DIAGNOSIS — F33.3 SEVERE EPISODE OF RECURRENT MAJOR DEPRESSIVE DISORDER, WITH PSYCHOTIC FEATURES: ICD-10-CM

## 2018-10-08 DIAGNOSIS — R45.851 SUICIDAL IDEATION: Primary | ICD-10-CM

## 2018-10-08 DIAGNOSIS — Z00.8 MEDICAL CLEARANCE FOR PSYCHIATRIC ADMISSION: ICD-10-CM

## 2018-10-08 DIAGNOSIS — R45.850 HOMICIDAL BEHAVIOR: ICD-10-CM

## 2018-10-08 PROBLEM — F32.A DEPRESSION: Status: ACTIVE | Noted: 2018-10-08

## 2018-10-08 LAB
ALBUMIN SERPL BCP-MCNC: 4.2 G/DL
ALP SERPL-CCNC: 90 U/L
ALT SERPL W/O P-5'-P-CCNC: 35 U/L
AMPHET+METHAMPHET UR QL: NEGATIVE
ANION GAP SERPL CALC-SCNC: 10 MMOL/L
APAP SERPL-MCNC: <10 UG/ML
AST SERPL-CCNC: 33 U/L
BARBITURATES UR QL SCN>200 NG/ML: NEGATIVE
BASOPHILS # BLD AUTO: 0.02 K/UL
BASOPHILS NFR BLD: 0.3 %
BENZODIAZ UR QL SCN>200 NG/ML: NEGATIVE
BILIRUB SERPL-MCNC: 0.2 MG/DL
BILIRUB UR QL STRIP: NEGATIVE
BUN SERPL-MCNC: 5 MG/DL
BZE UR QL SCN: NORMAL
CALCIUM SERPL-MCNC: 8.8 MG/DL
CANNABINOIDS UR QL SCN: NEGATIVE
CHLORIDE SERPL-SCNC: 108 MMOL/L
CLARITY UR REFRACT.AUTO: CLEAR
CO2 SERPL-SCNC: 21 MMOL/L
COLOR UR AUTO: NORMAL
CREAT SERPL-MCNC: 0.63 MG/DL
CREAT UR-MCNC: 28 MG/DL
DIFFERENTIAL METHOD: ABNORMAL
EOSINOPHIL # BLD AUTO: 0.1 K/UL
EOSINOPHIL NFR BLD: 1.5 %
ERYTHROCYTE [DISTWIDTH] IN BLOOD BY AUTOMATED COUNT: 16.7 %
EST. GFR  (AFRICAN AMERICAN): >60 ML/MIN/1.73 M^2
EST. GFR  (NON AFRICAN AMERICAN): >60 ML/MIN/1.73 M^2
ETHANOL SERPL-MCNC: 105 MG/DL
GLUCOSE SERPL-MCNC: 97 MG/DL
GLUCOSE UR QL STRIP: NEGATIVE
HCT VFR BLD AUTO: 36.8 %
HGB BLD-MCNC: 12.3 G/DL
HGB UR QL STRIP: NEGATIVE
KETONES UR QL STRIP: NEGATIVE
LEUKOCYTE ESTERASE UR QL STRIP: NEGATIVE
LYMPHOCYTES # BLD AUTO: 2.4 K/UL
LYMPHOCYTES NFR BLD: 41.4 %
MCH RBC QN AUTO: 25.3 PG
MCHC RBC AUTO-ENTMCNC: 33.4 G/DL
MCV RBC AUTO: 76 FL
METHADONE UR QL SCN>300 NG/ML: NEGATIVE
MONOCYTES # BLD AUTO: 0.4 K/UL
MONOCYTES NFR BLD: 6.2 %
NEUTROPHILS # BLD AUTO: 2.9 K/UL
NEUTROPHILS NFR BLD: 50.4 %
NITRITE UR QL STRIP: NEGATIVE
OPIATES UR QL SCN: NEGATIVE
PCP UR QL SCN>25 NG/ML: NEGATIVE
PH UR STRIP: 5 [PH] (ref 5–8)
PLATELET # BLD AUTO: 199 K/UL
PMV BLD AUTO: 9.9 FL
POTASSIUM SERPL-SCNC: 3.7 MMOL/L
PROT SERPL-MCNC: 8.1 G/DL
PROT UR QL STRIP: NEGATIVE
RBC # BLD AUTO: 4.86 M/UL
SODIUM SERPL-SCNC: 139 MMOL/L
SP GR UR STRIP: 1 (ref 1–1.03)
TOXICOLOGY INFORMATION: NORMAL
URN SPEC COLLECT METH UR: NORMAL
UROBILINOGEN UR STRIP-ACNC: NEGATIVE EU/DL
WBC # BLD AUTO: 5.84 K/UL

## 2018-10-08 PROCEDURE — 80320 DRUG SCREEN QUANTALCOHOLS: CPT

## 2018-10-08 PROCEDURE — 80307 DRUG TEST PRSMV CHEM ANLYZR: CPT

## 2018-10-08 PROCEDURE — 99285 EMERGENCY DEPT VISIT HI MDM: CPT

## 2018-10-08 PROCEDURE — 99283 EMERGENCY DEPT VISIT LOW MDM: CPT | Mod: GT,AF,HB, | Performed by: PSYCHIATRY & NEUROLOGY

## 2018-10-08 PROCEDURE — 93005 ELECTROCARDIOGRAM TRACING: CPT

## 2018-10-08 PROCEDURE — 80329 ANALGESICS NON-OPIOID 1 OR 2: CPT

## 2018-10-08 PROCEDURE — 80053 COMPREHEN METABOLIC PANEL: CPT

## 2018-10-08 PROCEDURE — 85025 COMPLETE CBC W/AUTO DIFF WBC: CPT

## 2018-10-08 PROCEDURE — 81003 URINALYSIS AUTO W/O SCOPE: CPT | Mod: 59

## 2018-10-08 PROCEDURE — 93010 ELECTROCARDIOGRAM REPORT: CPT | Mod: ,,, | Performed by: INTERNAL MEDICINE

## 2018-10-08 NOTE — CONSULTS
"Tele-Consultation to Emergency Department from Psychiatry    Please see previous notes:    Patient agreeable to consultation via telepsychiatry.    Consultation started: 10/8/2018 at 7:00am  The chief complaint leading to psychiatric consultation is: depression  This consultation was requested by Dr. Shoemaker, the Emergency Department attending physician.  The location of the consulting psychiatrist is [place] .  The patient location is [place] .  The patient arrived at the ED at: not known    Also present with the patient at the time of the consultation: none     Patient Identification:  Madison Hill is a 52 y.o. female.    Patient information was obtained from patient.  Patient presented voluntarily to the Emergency Department ambulatory.    History of Present Illness:  The patient is a 52 year old female who presents to the ER with CC of not wanting to be around anymore. The patient is refusing to participate in the interview, getting easily/quickly upset when asked questions about why she is present. She continually asks "who are you and why do you need to know my business" but won't answer questions .When asked, for example if she has ever attempted suicide before, she responds with "maybe" and then won't answer any further questions. She is notably irritable during the visit.    Psychiatric History:   Hospitalization: not known as she won't participate  Medication Trials: not known as she won't participate   Suicide Attempts: not known as she won't participate   Violence: not known  Depression: not known  Marcia: not known  AH's: not known  Delusions: not known    Review of Systems:  Psychological ROS: positive for - irritability    Past Medical History:   Past Medical History:   Diagnosis Date    Depression     Hepatitis     HBV    Hyperlipidemia     Liver disease stage 2 liver disease    Sleep apnea         Seizures: not known  Head trauma/l.o.c.: not known  Wish to become " "pregnant[if female of childbearing age]: n/a    Allergies: corticosteroids, morphine  Review of patient's allergies indicates:   Allergen Reactions    Corticosteroids (glucocorticoids) Palpitations    Morphine Anxiety       Medications in ER: Medications - No data to display    Medications at home: not known    Substance Abuse History:   Alchohol: not known  Drug: not known     Legal History:   Past charges/incarcerations: not known  Pending charges: not known    Family Psychiatric History: not known    Social History:   History of Physical/Sexual Abuse: not known  Education: not known    Employment/Disability: not known   Financial: not known  Relationship Status/Sexual Orientation: not known   Children: not known   Housing Status: not known  Scientologist: not known   History: not known   Recreational Activities: not known  Access to Gun: not known     Current Evaluation:     Constitutional  Vitals:  Vitals:    10/08/18 0641   BP: 120/78   Pulse: 104   Resp: 20   Temp: 99 °F (37.2 °C)   TempSrc: Oral   SpO2: 96%   Weight: 79.4 kg (175 lb)   Height: 5' 3" (1.6 m)      General:  unremarkable, age appropriate     Musculoskeletal  Muscle Strength/Tone:   moving arms normally   Gait & Station:   sitting on stretcher     Psychiatric  Level of Consciousness: alert  Orientation: oriented to person, place and time  Grooming: in hospital gown  Psychomotor Behavior: no agitation  Speech: normal in rate, rhythm and volume  Language: uses words appropriately  Mood: irritable  Affect: congruent with mood  Thought Process: linear  Associations: appears goal oriented  Thought Content: unable to assess  Memory: unable to assess  Attention: intact to interview  Fund of Knowledge: appears adequate  Insight: poor  Judgement: poor    Relevant Elements of Neurological Exam: no abnormality of posture noted    Assessment - Diagnosis - Goals:     Diagnosis/Impression: The patient is a 52 year old female with an unknown mental health " history who is irritable and is refusing to participate in the interview. I am not able to make a recommendation one way or the other on what should happen at this time as she is not participatory in the interview. I would say that if she informed the ER physician that she is having thoughts of suicide, ER physician should place her under a PEC due to concerns of danger to herself, but again, this would be done based on knowledge and interview done by eR physician as she is refusing to speak with this MD. Would recommend checking urine drug screen and blood alcohol level as well as these two factors could contribute to her refusal to participate in the interview as well.     Rec: See above under diagnosis/impression    Time with patient: 5 minutes as she refused to participate in the interview     More than 50% of the time was spent counseling/coordinating care    Laboratory Data:   Labs Reviewed   URINALYSIS, REFLEX TO URINE CULTURE    Narrative:     Preferred Collection Type->Urine, Clean Catch   CBC W/ AUTO DIFFERENTIAL   COMPREHENSIVE METABOLIC PANEL   DRUG SCREEN PANEL, URINE EMERGENCY   ALCOHOL,MEDICAL (ETHANOL)   ACETAMINOPHEN LEVEL         Consulting clinician was informed of the encounter and consult note.    Consultation ended: 10/8/2018 at 7:13am

## 2018-10-08 NOTE — ED NOTES
Faxed PEC packet to [facility], [facility], [facility], and [facility].

## 2018-10-08 NOTE — ED NOTES
Pt escorted to [facility]'s Transportation van per [facility]'s Staff x 2, [facility] x 2 and RN without any difficulty

## 2018-10-08 NOTE — ED NOTES
Pt's brown bag with belongings of lotion bottle x 2, slippers, soap, eye glass case, toothbrush, deodorant, curling iron, headscarf, fs card, medicine bottle, brown slippers, candy. Green bag has under wear, pj's, outfits of clothing, pink slippers.

## 2018-10-08 NOTE — ED NOTES
Spoke to Desi and informed her that we have not received the patient's PEC. Desi stated she would re-fax.

## 2018-10-08 NOTE — ED NOTES
CPT placement accepted by April at [facility] located at [address] for the service of Brandee Wright NP. Please call report in 20 minutes to [phone].

## 2018-10-08 NOTE — ED PROVIDER NOTES
"Encounter Date: 10/8/2018       History     Chief Complaint   Patient presents with    Mental Health Problem     ambulatory to ER room 10 from home with c/o "I've been off my meds and I'm just tired of living"; reports + SI without plan and AH; stated "I'm just tired of them, either they're going to hurt me or I'm going to hurt them"; unsure of what meds she's supposed to be taking; tearful in triage and repeating "I'm just tired"; also reports using cocaine and alcohol     Patient presents to ER with depression, homicidal ideations, suicidal ideations, auditory hallucination for last 2 weeks.  Patient says she is tired of living with this person at home and she wants to kill him and kill herself.  Patient claims she has relationship problems which she does not want to discuss.      The history is provided by the patient.     Review of patient's allergies indicates:   Allergen Reactions    Corticosteroids (glucocorticoids) Palpitations    Morphine Anxiety     Past Medical History:   Diagnosis Date    Depression     Hepatitis     HBV    Hyperlipidemia     Liver disease stage 2 liver disease    Sleep apnea      Past Surgical History:   Procedure Laterality Date    BIOPSY N/A 10/16/2014    Performed by [facility] at [facility]    HYSTERECTOMY      LIVER BIOPSY  10/16/2014    Stage 2 fibrosis     Family History   Problem Relation Age of Onset    No Known Problems Mother     No Known Problems Father     Colon cancer Neg Hx      Social History     Tobacco Use    Smoking status: Light Tobacco Smoker     Packs/day: 0.50     Types: Cigarettes    Smokeless tobacco: Never Used   Substance Use Topics    Alcohol use: Yes     Alcohol/week: 0.0 oz    Drug use: Yes     Types: Cocaine     Review of Systems   Constitutional: Negative for activity change, appetite change and fever.   HENT: Negative for congestion and sore throat.    Respiratory: Negative for cough, shortness of breath and wheezing.  "   Cardiovascular: Negative for palpitations.   Gastrointestinal: Negative for diarrhea, nausea and vomiting.   Genitourinary: Negative for dysuria and frequency.   Musculoskeletal: Negative for back pain, neck pain and neck stiffness.   Skin: Negative for rash.   Neurological: Negative for dizziness and headaches.   Psychiatric/Behavioral: Positive for behavioral problems, dysphoric mood, sleep disturbance and suicidal ideas. Negative for agitation, confusion, decreased concentration and hallucinations. The patient is not nervous/anxious.        Physical Exam     Initial Vitals [10/08/18 0641]   BP Pulse Resp Temp SpO2   120/78 104 20 99 °F (37.2 °C) 96 %      MAP       --         Physical Exam    Nursing note and vitals reviewed.  Constitutional: She appears well-developed and well-nourished.   HENT:   Right Ear: External ear normal.   Left Ear: External ear normal.   Nose: Nose normal.   Mouth/Throat: Oropharynx is clear and moist.   Eyes: Conjunctivae and EOM are normal. Pupils are equal, round, and reactive to light.   Neck: Normal range of motion.   Cardiovascular: Normal rate, regular rhythm, normal heart sounds and intact distal pulses.   Pulmonary/Chest: Breath sounds normal. No respiratory distress. She has no wheezes. She has no rhonchi. She has no rales. She exhibits no tenderness.   Abdominal: Soft. Bowel sounds are normal. She exhibits no distension. There is no tenderness.   Musculoskeletal: Normal range of motion.   Neurological: She is alert and oriented to person, place, and time. She has normal strength and normal reflexes. GCS score is 15. GCS eye subscore is 4. GCS verbal subscore is 5. GCS motor subscore is 6.   Skin: Skin is warm. Capillary refill takes less than 2 seconds. No rash noted.   Psychiatric: She has a normal mood and affect. Her speech is rapid and/or pressured. She is aggressive. She expresses impulsivity. She expresses homicidal and suicidal ideation.   Patient says she is off her  medication quite some time.  Wants to end her life.  Patient also thinking to kill her partner who is the main reason for her problems.         ED Course   Procedures  Labs Reviewed   CBC W/ AUTO DIFFERENTIAL   COMPREHENSIVE METABOLIC PANEL   URINALYSIS, REFLEX TO URINE CULTURE   DRUG SCREEN PANEL, URINE EMERGENCY   ALCOHOL,MEDICAL (ETHANOL)   ACETAMINOPHEN LEVEL     EKG Readings: (Independently Interpreted)   Initial Reading: No STEMI. Rhythm: Normal Sinus Rhythm. Heart Rate: 91. Ectopy: No Ectopy. Conduction: Normal. ST Segments: Normal ST Segments. T Waves: Normal. Clinical Impression: Normal Sinus Rhythm       Imaging Results    None         patient is medically cleared for inpatient psychiatric treatment and management.  Medical Decision Making:   Initial Assessment:   52-year-old female does not want to talk much complains of suicidal, homicidal, depression from her family issues.  She does not want to live like this any more.  She does not have a plan but could not tell anybody about her plan.  Differential Diagnosis:   Suicidal ideation, homicidal ideation, anxiety, depression, hallucinations.  Social issue,   Clinical Tests:   Lab Tests: Ordered and Reviewed  Radiological Study: Ordered and Reviewed  Medical Tests: Ordered and Reviewed  ED Management:  Currently patient is suicidal and homicidal, not very cooperative to talk about her personal issues.  Patient looks like under alcohol influence and cocaine since.  Patient is PEC'd for psychiatric treatment.                      Clinical Impression:   The primary encounter diagnosis was Suicidal ideation. Diagnoses of Medical clearance for psychiatric admission, Homicidal behavior, Severe episode of recurrent major depressive disorder, with psychotic features, and Cocaine abuse were also pertinent to this visit.      Disposition:   Disposition: Transferred  Condition: Serious                        Ghanshyam Goodrich MD  10/08/18 1002

## 2018-10-08 NOTE — ED NOTES
Received pt resting in bed, in room with sitter at bedside, [facility] aware of pt's presence, pt skin warm and dry, resp even and unlabored, pt without any distress, pt without any complaint, pt without any request, will continue to monitor.

## 2018-10-08 NOTE — ED NOTES
Pt's belongings:  16 - 1 dollar bills (in pants pocket)  Lighter  White earrings  Orange tshirt  Black bra  Blue jeans  Brown purse  Green bag  Black hair stephie

## 2018-10-10 PROBLEM — E78.5 HYPERLIPIDEMIA: Status: ACTIVE | Noted: 2018-02-05

## 2018-10-10 PROBLEM — F14.20 COCAINE DEPENDENCE, CONTINUOUS: Status: ACTIVE | Noted: 2018-10-10

## 2018-10-10 PROBLEM — K21.9 GASTROESOPHAGEAL REFLUX DISEASE WITHOUT ESOPHAGITIS: Status: ACTIVE | Noted: 2018-10-10

## 2018-11-09 RX ORDER — TRAMADOL HYDROCHLORIDE 50 MG/1
TABLET ORAL
Qty: 120 TABLET | Status: CANCELLED | OUTPATIENT
Start: 2018-11-09

## 2019-07-12 ENCOUNTER — OFFICE VISIT (OUTPATIENT)
Dept: FAMILY MEDICINE | Facility: CLINIC | Age: 54
End: 2019-07-12
Payer: MEDICAID

## 2019-07-12 VITALS
RESPIRATION RATE: 16 BRPM | HEIGHT: 65 IN | OXYGEN SATURATION: 98 % | SYSTOLIC BLOOD PRESSURE: 110 MMHG | HEART RATE: 65 BPM | WEIGHT: 196 LBS | DIASTOLIC BLOOD PRESSURE: 78 MMHG | TEMPERATURE: 98 F | BODY MASS INDEX: 32.65 KG/M2

## 2019-07-12 DIAGNOSIS — K74.60 HEPATIC CIRRHOSIS, UNSPECIFIED HEPATIC CIRRHOSIS TYPE, UNSPECIFIED WHETHER ASCITES PRESENT: ICD-10-CM

## 2019-07-12 DIAGNOSIS — N30.00 ACUTE CYSTITIS WITHOUT HEMATURIA: ICD-10-CM

## 2019-07-12 DIAGNOSIS — B18.1 CHRONIC VIRAL HEPATITIS B WITHOUT DELTA AGENT AND WITHOUT COMA: ICD-10-CM

## 2019-07-12 DIAGNOSIS — F33.3 SEVERE EPISODE OF RECURRENT MAJOR DEPRESSIVE DISORDER, WITH PSYCHOTIC FEATURES: ICD-10-CM

## 2019-07-12 DIAGNOSIS — E66.9 OBESITY (BMI 30.0-34.9): ICD-10-CM

## 2019-07-12 DIAGNOSIS — F20.3 UNDIFFERENTIATED SCHIZOPHRENIA: Chronic | ICD-10-CM

## 2019-07-12 DIAGNOSIS — M06.9 RHEUMATOID ARTHRITIS, INVOLVING UNSPECIFIED SITE, UNSPECIFIED RHEUMATOID FACTOR PRESENCE: ICD-10-CM

## 2019-07-12 DIAGNOSIS — F14.20 COCAINE DEPENDENCE, CONTINUOUS: ICD-10-CM

## 2019-07-12 DIAGNOSIS — Z76.89 ENCOUNTER TO ESTABLISH CARE: Primary | ICD-10-CM

## 2019-07-12 PROCEDURE — 99204 OFFICE O/P NEW MOD 45 MIN: CPT | Mod: S$GLB,,, | Performed by: FAMILY MEDICINE

## 2019-07-12 PROCEDURE — 99204 PR OFFICE/OUTPT VISIT, NEW, LEVL IV, 45-59 MIN: ICD-10-PCS | Mod: S$GLB,,, | Performed by: FAMILY MEDICINE

## 2019-07-12 RX ORDER — TRAMADOL HYDROCHLORIDE 50 MG/1
TABLET ORAL
Qty: 60 TABLET | Refills: 0 | Status: SHIPPED | OUTPATIENT
Start: 2019-07-12 | End: 2019-09-30 | Stop reason: SDUPTHER

## 2019-07-12 RX ORDER — OMEPRAZOLE 20 MG/1
20 CAPSULE, DELAYED RELEASE ORAL DAILY
COMMUNITY

## 2019-07-12 RX ORDER — FLUCONAZOLE 150 MG/1
150 TABLET ORAL DAILY
Qty: 1 TABLET | Refills: 0 | Status: SHIPPED | OUTPATIENT
Start: 2019-07-12 | End: 2019-07-13

## 2019-07-12 NOTE — PROGRESS NOTES
Subjective:       Patient ID: Madison Hill is a 53 y.o. female.    Chief Complaint: Establish Care    54 yo F here to [facility]. Pt just moved here from [place]. She was there for 4 months and had moved there from [place]. pMHx of RA, anxiety, depression, schizophrenia, liver cirrhosis, HBV positive, etc.  Pt wants us to test her for STDs as she has a thick white discharge and smelly odor and pruritus.   Pt wants a referral to several specialists: psychiatry, GI and ID.   Pt thinks that she has tried to take care of herself but the providers were not referring her anywhere.      Review of Systems   Constitutional: Negative for activity change, chills, fatigue, fever and unexpected weight change.   HENT: Negative for ear pain, rhinorrhea and trouble swallowing.    Eyes: Negative for pain.   Respiratory: Negative for cough, chest tightness, shortness of breath and wheezing.    Cardiovascular: Negative for chest pain and palpitations.   Gastrointestinal: Negative for abdominal distention, constipation, diarrhea, nausea and vomiting.   Endocrine: Negative for cold intolerance and heat intolerance.   Genitourinary: Positive for vaginal discharge. Negative for dysuria, frequency and urgency.   Musculoskeletal: Positive for arthralgias. Negative for myalgias.   Skin: Negative for rash.   Neurological: Negative for dizziness, syncope, light-headedness and headaches.   Hematological: Does not bruise/bleed easily.   Psychiatric/Behavioral: Negative for agitation and confusion.       Objective:      Physical Exam   Constitutional: She appears well-developed.   HENT:   Right Ear: External ear normal.   Left Ear: External ear normal.   Mouth/Throat: Oropharynx is clear and moist.   Eyes: Conjunctivae and EOM are normal.   Neck: Normal range of motion.   Cardiovascular: Normal rate, regular rhythm and intact distal pulses.   Pulmonary/Chest: Effort normal and breath sounds normal.   Abdominal: Soft.   Skin: Skin is warm.  Capillary refill takes less than 2 seconds.   Psychiatric: She has a normal mood and affect.       Assessment:       1. Encounter to establish care    2. Hepatic cirrhosis, unspecified hepatic cirrhosis type, unspecified whether ascites present    3. Chronic viral hepatitis B without delta agent and without coma    4. Rheumatoid arthritis, involving unspecified site, unspecified rheumatoid factor presence    5. Undifferentiated schizophrenia    6. Obesity (BMI 30.0-34.9)    7. Severe episode of recurrent major depressive disorder, with psychotic features    8. Cocaine dependence, continuous    9. Acute cystitis without hematuria        Plan:       PROBLEM LIST     Madison was seen today for establish care.    Diagnoses and all orders for this visit:    Encounter to establish care  -     CBC auto differential; Future  -     Comprehensive metabolic panel; Future  -     Lipid panel; Future  -     CBC auto differential  -     Comprehensive metabolic panel  -     Lipid panel    Hepatic cirrhosis, unspecified hepatic cirrhosis type, unspecified whether ascites present  -     Ambulatory Referral to Gastroenterology    Chronic viral hepatitis B without delta agent and without coma  -     Ambulatory Referral to Infectious Disease  -     Ambulatory Referral to Gastroenterology    Rheumatoid arthritis, involving unspecified site, unspecified rheumatoid factor presence  -     Rheumatoid factor; Future  -     Sedimentation rate; Future  -     C-reactive protein; Future  -     SARAH IFA, w/Rflx to Pattern/Titer & Profile; Future  -     Cyclic citrul peptide antibody, IgG; Future  -     traMADol (ULTRAM) 50 mg tablet; Take one tab BiD as needed for pain  -     Rheumatoid factor  -     Sedimentation rate  -     C-reactive protein  -     SARAH IFA, w/Rflx to Pattern/Titer & Profile  -     Cyclic citrul peptide antibody, IgG    Undifferentiated schizophrenia    Obesity (BMI 30.0-34.9)    Severe episode of recurrent major depressive  disorder, with psychotic features  -     Ambulatory Referral to Psychiatry    Cocaine dependence, continuous  Comments:  pt says it's way beyond that. she is clean now since 10/18    Acute cystitis without hematuria  -     Urinalysis, Reflex to Urine Culture Urine, Clean Catch  -     fluconazole (DIFLUCAN) 150 MG Tab; Take 1 tablet (150 mg total) by mouth once daily. for 1 day  -     C. trachomatis/N. gonorrhoeae by AMP DNA [facility]; Urine    Other orders  -     Multiple Orders from Different Physicians  -     CCP (CYCLIC CITRULLINATED PEPTIDE)  -     Rheumatoid Qualitative  -     Urine culture

## 2019-07-15 LAB
ABS NRBC COUNT: 0 X 10 3/UL (ref 0–0.01)
ABSOLUTE BASOPHIL: 0.02 X 10 3/UL (ref 0–0.22)
ABSOLUTE EOSINOPHIL: 0.07 X 10 3/UL (ref 0.04–0.54)
ABSOLUTE IMMATURE GRAN: 0.01 X 10 3/UL (ref 0–0.04)
ABSOLUTE LYMPHOCYTE: 2.48 X 10 3/UL (ref 0.86–4.75)
ABSOLUTE MONOCYTE: 0.49 X 10 3/UL (ref 0.22–1.08)
ALBUMIN SERPL-MCNC: 4.7 G/DL (ref 3.5–5.2)
ALBUMIN/GLOB SERPL ELPH: 1.2 {RATIO} (ref 1–2.7)
ALP ISOS SERPL LEV INH-CCNC: 90 IU/L (ref 35–105)
ALT (SGPT): 44 U/L (ref 0–33)
ANA SER-ACNC: NEGATIVE
ANION GAP SERPL CALC-SCNC: 9 MMOL/L (ref 8–17)
ANTI-CCP: <8 U/ML
AST SERPL-CCNC: 28 U/L (ref 0–32)
BASOPHILS NFR BLD: 0.3 %
BILIRUBIN, TOTAL: 0.34 MG/DL (ref 0–1.2)
BUN/CREAT SERPL: 17.7 (ref 6–20)
CALCIUM SERPL-MCNC: 10.1 MG/DL (ref 8.6–10.2)
CARBON DIOXIDE, CO2: 28 MMOL/L (ref 22–29)
CHLORIDE: 102 MMOL/L (ref 98–107)
CHOLEST SERPL-MSCNC: 258 MG/DL (ref 100–200)
CREAT SERPL-MCNC: 0.7 MG/DL (ref 0.5–0.9)
CRP QUALITATIVE: NEGATIVE MG/L
CRP QUANTITATIVE: 3.3 MG/L
EOSINOPHIL NFR BLD: 1.2 %
GFR ESTIMATION: 87.87
GLOBULIN: 4 G/DL (ref 1.5–4.5)
GLUCOSE: 94 MG/DL (ref 74–106)
HCT VFR BLD AUTO: 43.6 % (ref 37–47)
HDLC SERPL-MCNC: 54 MG/DL
HGB BLD-MCNC: 13.8 G/DL (ref 12–16)
IMMATURE GRANULOCYTES: 0.2 % (ref 0–0.5)
LDL/HDL RATIO: 3.5 (ref 1–3)
LDLC SERPL CALC-MCNC: 189 MG/DL (ref 0–100)
LYMPHOCYTES NFR BLD: 40.9 %
MCH RBC QN AUTO: 24.6 PG (ref 27–32)
MCHC RBC AUTO-ENTMCNC: 31.7 G/DL (ref 32–36)
MCV RBC AUTO: 77.7 FL (ref 82–100)
MONOCYTES NFR BLD: 8.1 %
MULTIPLE ORDERS: NORMAL
NEUTROPHILS ABSOLUTE COUNT: 3 X 10 3/UL (ref 2.15–7.56)
NEUTROPHILS NFR BLD: 49.3 %
NUCLEATED RED BLOOD CELLS: 0 /100 WBC (ref 0–0.2)
PLATELET # BLD AUTO: 220 X 10 3/UL (ref 135–400)
POTASSIUM: 4.2 MMOL/L (ref 3.5–5.1)
PROT SNV-MCNC: 8.7 G/DL (ref 6.4–8.3)
RBC # BLD AUTO: 5.61 X 10 6/UL (ref 4.2–5.4)
RDW-SD: 46 FL (ref 37–54)
RHEUMATOID ARTHRITIS FACTOR: POSITIVE IU/ML
RHEUMATOID ARTHRITIS, QN/FLUID: 182.3 IU/ML
SED RATE (WESTERGREN): 9 MM/HR (ref 0–30)
SODIUM: 139 MMOL/L (ref 136–145)
TRIGL SERPL-MCNC: 75 MG/DL (ref 0–150)
UREA NITROGEN (BUN): 12.4 MG/DL (ref 6–20)
WBC # BLD: 6.07 X 10 3/UL (ref 4.3–10.8)

## 2019-07-16 LAB
AMORPH URATE CRY URNS QL MICRO: NEGATIVE
BACTERIA #/AREA URNS HPF: ABNORMAL /[HPF]
BILIRUB UR QL STRIP: NEGATIVE
CHLAMYDIA: NEGATIVE
CLARITY UR: CLEAR
COLOR UR: YELLOW
EPITHELIAL CELLS: ABNORMAL
GLUCOSE (UA): NEGATIVE MG/DL
GONORRHEA: NEGATIVE
KETONES UR QL STRIP: NEGATIVE MG/DL
LEUKOCYTE ESTERASE UR QL STRIP: ABNORMAL
MUCOUS THREADS URNS QL MICRO: ABNORMAL
NITRITE UR QL STRIP: NEGATIVE
OCCULT BLOOD: NEGATIVE
PH, URINE: 6.5 (ref 5–7.5)
PROT UR QL STRIP: NEGATIVE MG/DL
RBC/HPF: NEGATIVE
SOURCE: NORMAL
SP GR UR STRIP: 1.02 (ref 1–1.03)
TRICHOMONAS: ABNORMAL
URINE CULTURE, ROUTINE: NORMAL
UROBILINOGEN, URINE: NORMAL E.U./DL (ref 0–1)
WBC/HPF: ABNORMAL

## 2019-07-19 ENCOUNTER — OFFICE VISIT (OUTPATIENT)
Dept: FAMILY MEDICINE | Facility: CLINIC | Age: 54
End: 2019-07-19
Payer: MEDICAID

## 2019-07-19 VITALS
OXYGEN SATURATION: 98 % | WEIGHT: 193.25 LBS | DIASTOLIC BLOOD PRESSURE: 72 MMHG | HEIGHT: 65 IN | TEMPERATURE: 97 F | HEART RATE: 72 BPM | SYSTOLIC BLOOD PRESSURE: 118 MMHG | BODY MASS INDEX: 32.2 KG/M2

## 2019-07-19 DIAGNOSIS — A64 STD (FEMALE): ICD-10-CM

## 2019-07-19 DIAGNOSIS — R71.8 RBC MICROCYTOSIS: ICD-10-CM

## 2019-07-19 DIAGNOSIS — E78.5 HYPERLIPIDEMIA, UNSPECIFIED HYPERLIPIDEMIA TYPE: ICD-10-CM

## 2019-07-19 DIAGNOSIS — M06.9 RHEUMATOID ARTHRITIS, INVOLVING UNSPECIFIED SITE, UNSPECIFIED RHEUMATOID FACTOR PRESENCE: ICD-10-CM

## 2019-07-19 DIAGNOSIS — Z71.2 ENCOUNTER TO DISCUSS TEST RESULTS: Primary | ICD-10-CM

## 2019-07-19 DIAGNOSIS — A59.9 TRICHOMONIASIS: ICD-10-CM

## 2019-07-19 DIAGNOSIS — K59.00 CONSTIPATION, UNSPECIFIED CONSTIPATION TYPE: ICD-10-CM

## 2019-07-19 PROCEDURE — 99213 PR OFFICE/OUTPT VISIT, EST, LEVL III, 20-29 MIN: ICD-10-PCS | Mod: S$GLB,,, | Performed by: FAMILY MEDICINE

## 2019-07-19 PROCEDURE — 99213 OFFICE O/P EST LOW 20 MIN: CPT | Mod: S$GLB,,, | Performed by: FAMILY MEDICINE

## 2019-07-19 RX ORDER — METRONIDAZOLE 250 MG/1
2000 TABLET ORAL ONCE
Qty: 8 TABLET | Refills: 0 | Status: SHIPPED | OUTPATIENT
Start: 2019-07-19 | End: 2019-07-19

## 2019-07-22 PROBLEM — M06.9 RHEUMATOID ARTHRITIS: Status: ACTIVE | Noted: 2019-07-22

## 2019-07-22 NOTE — PROGRESS NOTES
"Subjective:       Patient ID: Madison Hill is a 53 y.o. female.    Chief Complaint: Vaginal Discharge (Pt stated that she has greenish discharge, along with itching, burning, and some leakage.)    54 yo F here to discuss lab results and for treatment for her "UTI."  UTI: discussed that she has trichomonas in her urine. Pt was "unaware" that trichomonas is an STI. Discussed that her partner should get treated as well and that he most likely is asymptomatic. Pt will make a decision whether to contact him or not, as she does not see him frequently - they are not in a close relationship.  Labs: all other labs are largely normal. Discussed each lab result separately. Pt has HLD for which she does not want any medication and she has a high count of RBC which we discussed to monitor.  NOTE: After the visit the rheumatoid workup came in and pt is positive for RA. We will call to find out whether she'll continue seeing her "old" Rheumatoligist or whether she likes to get a referral    Review of Systems   Constitutional: Negative for activity change, chills, fatigue, fever and unexpected weight change.   HENT: Negative for ear pain, rhinorrhea and trouble swallowing.    Eyes: Negative for pain.   Respiratory: Negative for cough, chest tightness, shortness of breath and wheezing.    Cardiovascular: Negative for chest pain and palpitations.   Gastrointestinal: Negative for abdominal distention, abdominal pain, constipation, diarrhea, nausea and vomiting.   Endocrine: Negative for cold intolerance and heat intolerance.   Genitourinary: Positive for vaginal discharge. Negative for dysuria, frequency, hematuria and urgency.   Musculoskeletal: Negative for myalgias.   Skin: Negative for rash.   Neurological: Negative for dizziness, syncope, light-headedness and headaches.   Hematological: Does not bruise/bleed easily.   Psychiatric/Behavioral: Negative for agitation and confusion.       Objective:      Physical Exam "   Constitutional: She appears well-developed.   HENT:   Right Ear: External ear normal.   Left Ear: External ear normal.   Mouth/Throat: Oropharynx is clear and moist.   Eyes: Conjunctivae and EOM are normal.   Neck: Normal range of motion.   Cardiovascular: Normal rate, regular rhythm and intact distal pulses.   Pulmonary/Chest: Effort normal and breath sounds normal.   Abdominal: Soft.   Skin: Skin is warm. Capillary refill takes less than 2 seconds.   Psychiatric: She has a normal mood and affect.       Assessment:       1. Encounter to discuss test results    2. Trichomoniasis    3. STD (female)    4. RBC microcytosis    5. Constipation, unspecified constipation type    6. Hyperlipidemia, unspecified hyperlipidemia type        Plan:       PROBLEM LIST     Madison was seen today for vaginal discharge.    Diagnoses and all orders for this visit:    Encounter to discuss test results    Trichomoniasis  -     metroNIDAZOLE (FLAGYL) 250 MG tablet; Take 8 tablets (2,000 mg total) by mouth once. for 1 dose    STD (female)    RBC microcytosis  Comments:  mcv = 77, no anemia    Constipation, unspecified constipation type  -     linaCLOtide (LINZESS) 290 mcg Cap capsule; Take 1 capsule (290 mcg total) by mouth once daily.    Hyperlipidemia, unspecified hyperlipidemia type

## 2019-08-01 ENCOUNTER — TELEPHONE (OUTPATIENT)
Dept: FAMILY MEDICINE | Facility: CLINIC | Age: 54
End: 2019-08-01

## 2019-08-01 NOTE — TELEPHONE ENCOUNTER
08/01/19 08:22am I spoke to pt to let her know that her test came back positive for rheumatoid arthritis and if she wanted us to refer her to an Rheumatologist. Pt stated that she did want to be referred. ALBERTO Rothman    ----- Message from Susi Chambers MD sent at 8/1/2019  8:01 AM CDT -----  Please call the patient regarding her positive result for rheumatoid factor - does she want us to refer? Does she have a preference

## 2019-08-02 DIAGNOSIS — R76.8 RHEUMATOID FACTOR POSITIVE: Primary | ICD-10-CM

## 2019-08-15 ENCOUNTER — OFFICE VISIT (OUTPATIENT)
Dept: FAMILY MEDICINE | Facility: CLINIC | Age: 54
End: 2019-08-15
Payer: MEDICAID

## 2019-08-15 VITALS
SYSTOLIC BLOOD PRESSURE: 110 MMHG | DIASTOLIC BLOOD PRESSURE: 79 MMHG | OXYGEN SATURATION: 98 % | HEIGHT: 65 IN | RESPIRATION RATE: 18 BRPM | HEART RATE: 67 BPM | BODY MASS INDEX: 32.82 KG/M2 | WEIGHT: 197 LBS

## 2019-08-15 DIAGNOSIS — B18.1 CHRONIC HEPATITIS B: Primary | ICD-10-CM

## 2019-08-15 DIAGNOSIS — F20.3 UNDIFFERENTIATED SCHIZOPHRENIA: Chronic | ICD-10-CM

## 2019-08-15 PROCEDURE — 99203 OFFICE O/P NEW LOW 30 MIN: CPT | Mod: S$GLB,,, | Performed by: INTERNAL MEDICINE

## 2019-08-15 PROCEDURE — 99203 PR OFFICE/OUTPT VISIT, NEW, LEVL III, 30-44 MIN: ICD-10-PCS | Mod: S$GLB,,, | Performed by: INTERNAL MEDICINE

## 2019-08-15 NOTE — LETTER
August 15, 2019      Susi Chambers MD  [address]  [address]  [place] [address]           [place] [facility]Infectious Disease  [facility] [facility] [address]  Phone: [phone]  Fax: [phone]          Patient: Madison Hill   MR Number: [id number]   YOB: 1965   Date of Visit: 8/15/2019       Dear Dr. Susi Chambers:    Thank you for referring Madison Hill to me for evaluation. Attached you will find relevant portions of my assessment and plan of care.    If you have questions, please do not hesitate to call me. I look forward to following Madison Hill along with you.    Sincerely,    Frantz Tong MD    Enclosure  CC:  No Recipients    If you would like to receive this communication electronically, please contact [email or web address] or [phone] to request more information on [employer] Link access.    For providers and/or their staff who would like to refer a patient to [facility], please contact us through our one-stop-shop provider referral line, [facility], at [phone].    If you feel you have received this communication in error or would no longer like to receive these types of communications, please e-mail [email or web address]         
follows commands/alert

## 2019-08-15 NOTE — PROGRESS NOTES
Subjective:       Patient ID: Madison Hill is a 53 y.o. female.    Chief Complaint: Hepatitis B (Dr Chambers Referral.)    Patient is here for evaluation and treatment of hepatitis B. she reports she was diagnosed approximately 4 years ago.  She has never been treated.  She did have a liver biopsy done at a facility in [place].  She has not had any complications related to the hepatitis B. no history of hematemesis, she does have a history of melena but this has been attributed to hemorrhoids.  She is not sure where she acquired the hepatitis-B, she does not have any known contact with anyone with hepatitis-B.  Denies history of blood transfusion.  Also has history of schizophrenia as well as history of cocaine abuse.  Denies any recent drug use.  Reports schizophrenia as well controlled.    Review of Systems   Constitutional: Negative for chills and fever.   HENT: Negative for ear pain, rhinorrhea and sore throat.    Eyes: Negative for pain and visual disturbance.   Respiratory: Negative for cough, shortness of breath and wheezing.    Cardiovascular: Negative for chest pain and palpitations.   Gastrointestinal: Negative for abdominal pain, constipation, diarrhea, nausea and vomiting.   Endocrine: Negative for cold intolerance and heat intolerance.   Genitourinary: Negative for difficulty urinating, dysuria and hematuria.   Musculoskeletal: Negative for back pain, joint swelling and myalgias.   Skin: Negative for rash and wound.   Neurological: Negative for dizziness, weakness and headaches.   Psychiatric/Behavioral: Negative for agitation and confusion.       Objective:      Physical Exam   Constitutional: She is oriented to person, place, and time. She appears well-developed and well-nourished.   HENT:   Head: Normocephalic and atraumatic.   Eyes: Pupils are equal, round, and reactive to light. No scleral icterus.   Neck: Neck supple. No tracheal deviation present.   Cardiovascular: Normal rate, regular rhythm and  normal heart sounds.   No murmur heard.  Pulmonary/Chest: Effort normal and breath sounds normal. No respiratory distress. She has no wheezes.   Abdominal: Soft. Bowel sounds are normal. She exhibits no distension. There is no tenderness. There is no guarding.   Musculoskeletal: Normal range of motion. She exhibits no edema or tenderness.   Neurological: She is alert and oriented to person, place, and time.   Skin: Skin is warm and dry. No rash noted.   Psychiatric: She has a normal mood and affect. Her behavior is normal.   Nursing note and vitals reviewed.      Assessment:       1. Chronic hepatitis B    2. Undifferentiated schizophrenia        Plan:       Problem List Items Addressed This Visit        Psychiatric    Undifferentiated schizophrenia (Chronic)     Reports that this is well controlled at this time, not using drugs.  Will need to monitor this closely if we do initiate therapy given the importance of 100% compliance.            GI    Chronic hepatitis B - Primary     Was diagnosed approximately 4 years ago, liver biopsy done 3 years ago showed stage II fibrosis.  No known history of complications or cirrhosis.  Do need to repeat labs to see level of activity including viral load, E antigen.  May need liver biopsy repeated.  Will discuss treatment options once labs are available.  Will do HIV for completeness.         Relevant Orders    Hepatitis B e antigen    HEPATITIS (HBSAG)    Hepatitis B Surface AB, Quantitative    Hepatitis B DNA, Quant    HIV 1/2 Ag/Ab (4th Gen)

## 2019-08-15 NOTE — ASSESSMENT & PLAN NOTE
Was diagnosed approximately 4 years ago, liver biopsy done 3 years ago showed stage II fibrosis.  No known history of complications or cirrhosis.  Do need to repeat labs to see level of activity including viral load, E antigen.  May need liver biopsy repeated.  Will discuss treatment options once labs are available.  Will do HIV for completeness.

## 2019-08-15 NOTE — ASSESSMENT & PLAN NOTE
Reports that this is well controlled at this time, not using drugs.  Will need to monitor this closely if we do initiate therapy given the importance of 100% compliance.

## 2019-08-19 LAB
HBV SURFACE AG SERPL QL IA: REACTIVE
HEP BSAG CONFIRMATION: ABNORMAL
HEPATITIS B AB QUANT: <8.5 MIU/ML
HIV 1+2 AB+HIV1 P24 AG SERPL QL IA: NONREACTIVE
Lab: NORMAL

## 2019-08-20 LAB — HBV E AG SERPL QL IA: NEGATIVE

## 2019-08-21 LAB
HBSAG CONFIRMATION: POSITIVE
HEPATITIS B SURFACE AG: NORMAL

## 2019-09-10 LAB
HBV GENOTYPE: NORMAL
HBV GENOTYPE: NORMAL
HBV PRECORE MUTATION: NORMAL
HBV QUANT BY NAAT (IU/ML): NORMAL IU/ML
LOG10 HBV AS IU/ML: 4.53 LOG10 IU/ML
TEST INFORMATION:: NORMAL

## 2019-09-23 ENCOUNTER — TELEPHONE (OUTPATIENT)
Dept: FAMILY MEDICINE | Facility: CLINIC | Age: 54
End: 2019-09-23

## 2019-09-23 DIAGNOSIS — B18.1 CHRONIC HEPATITIS B: Primary | ICD-10-CM

## 2019-09-23 RX ORDER — TENOFOVIR DISOPROXIL FUMARATE 300 MG/1
300 TABLET, FILM COATED ORAL DAILY
Qty: 30 TABLET | Refills: 11 | Status: SHIPPED | OUTPATIENT
Start: 2019-09-23 | End: 2020-06-11 | Stop reason: SDUPTHER

## 2019-09-23 NOTE — TELEPHONE ENCOUNTER
Pt notified.      Frantz Tong MD   Sent: Mon September 23, 2019 11:07 AM   To: Roselyn Zimmerman MA          Message     Hep B results reviewed, do show need to treat, will send Rx to [facility] today

## 2019-09-30 ENCOUNTER — OFFICE VISIT (OUTPATIENT)
Dept: FAMILY MEDICINE | Facility: CLINIC | Age: 54
End: 2019-09-30
Payer: MEDICAID

## 2019-09-30 DIAGNOSIS — M25.50 ARTHRALGIA, UNSPECIFIED JOINT: ICD-10-CM

## 2019-09-30 DIAGNOSIS — R05.9 COUGH: ICD-10-CM

## 2019-09-30 DIAGNOSIS — R09.81 CHRONIC NASAL CONGESTION: ICD-10-CM

## 2019-09-30 DIAGNOSIS — M06.9 RHEUMATOID ARTHRITIS, INVOLVING UNSPECIFIED SITE, UNSPECIFIED RHEUMATOID FACTOR PRESENCE: Primary | ICD-10-CM

## 2019-09-30 PROCEDURE — 99213 PR OFFICE/OUTPT VISIT, EST, LEVL III, 20-29 MIN: ICD-10-PCS | Mod: S$GLB,,, | Performed by: FAMILY MEDICINE

## 2019-09-30 PROCEDURE — 99213 OFFICE O/P EST LOW 20 MIN: CPT | Mod: S$GLB,,, | Performed by: FAMILY MEDICINE

## 2019-09-30 RX ORDER — IBUPROFEN 800 MG/1
800 TABLET ORAL 2 TIMES DAILY PRN
Qty: 60 TABLET | Refills: 0 | Status: SHIPPED | OUTPATIENT
Start: 2019-09-30 | End: 2020-02-11

## 2019-09-30 RX ORDER — GUAIFENESIN/DEXTROMETHORPHAN 100-10MG/5
5 SYRUP ORAL EVERY 6 HOURS PRN
Qty: 236 ML | Refills: 0 | Status: SHIPPED | OUTPATIENT
Start: 2019-09-30 | End: 2019-10-10

## 2019-09-30 RX ORDER — TRAMADOL HYDROCHLORIDE 50 MG/1
TABLET ORAL
Qty: 60 TABLET | Refills: 0 | Status: SHIPPED | OUTPATIENT
Start: 2019-09-30 | End: 2020-02-11

## 2019-09-30 RX ORDER — MONTELUKAST SODIUM 10 MG/1
10 TABLET ORAL DAILY
Qty: 30 TABLET | Refills: 0 | Status: SHIPPED | OUTPATIENT
Start: 2019-09-30 | End: 2019-10-30

## 2019-09-30 RX ORDER — AMOXICILLIN 500 MG/1
500 TABLET, FILM COATED ORAL EVERY 12 HOURS
Qty: 10 TABLET | Refills: 0 | Status: SHIPPED | OUTPATIENT
Start: 2019-09-30 | End: 2019-10-05

## 2019-09-30 NOTE — LETTER
September 30, 2019      [place] [address] [address] [address]  Phone: [phone]  Fax: [phone]       Patient: Madison Hill   YOB: 1965  Date of Visit: 09/30/2019    To Whom It May Concern:    Columba Hill  was at [facility] on 09/30/2019. Please excuse her from 9/29/2019 and 9/30/2019. She may return to work/school on 10/01/2019 with no restrictions. If you have any questions or concerns, or if I can be of further assistance, please do not hesitate to contact me.    Sincerely,    Rosanne High MA

## 2019-09-30 NOTE — LETTER
September 30, 2019      [place] [address] [address] [address]  Phone: [phone]  Fax: [phone]       Patient: Madison Hill   YOB: 1965  Date of Visit: 09/30/2019    To Whom It May Concern:    Columba Hill  was at [facility] on 09/30/2019. Pt states she was sick last night and could not go to work. She may return to work/school on 9/30/19 without restrictions. If you have any questions or concerns, or if I can be of further assistance, please do not hesitate to contact me.    Sincerely,    Susi Chambers MD

## 2019-09-30 NOTE — PROGRESS NOTES
Subjective:       Patient ID: Madison Hill is a 53 y.o. female.    Chief Complaint: Hemorrhoids    54 yo F here for cough, body pains, congestion. This has been going on for about 1 week and pt thinks it is a cold. She likes to have ibuprofen filled cough medicine and something to dry her out. She is very adamant that she won't get any better without antibiotics. Discussed that abx are only indicated for symptoms for 3 weeks or more - pt then states that her symptoms are actually going on for about 3 weeks. She also is hoarse and other people are sick around her.     Review of Systems   Constitutional: Positive for chills. Negative for activity change, fatigue, fever and unexpected weight change.   HENT: Positive for congestion and voice change. Negative for ear pain, rhinorrhea and trouble swallowing.    Eyes: Negative for pain.   Respiratory: Positive for cough. Negative for chest tightness, shortness of breath and wheezing.    Cardiovascular: Negative for chest pain and palpitations.   Gastrointestinal: Negative for abdominal distention, abdominal pain, constipation, diarrhea, nausea and vomiting.   Endocrine: Negative for cold intolerance and heat intolerance.   Genitourinary: Negative for dysuria, frequency and urgency.   Musculoskeletal: Negative for myalgias.   Skin: Negative for rash.   Neurological: Negative for dizziness, syncope, light-headedness and headaches.   Hematological: Does not bruise/bleed easily.   Psychiatric/Behavioral: Negative for agitation and confusion.       Objective:      Physical Exam   Constitutional: She appears well-developed.   HENT:   Right Ear: External ear normal.   Left Ear: External ear normal.   Mouth/Throat: Oropharynx is clear and moist.   Eyes: Conjunctivae and EOM are normal.   Neck: Normal range of motion.   Cardiovascular: Normal rate, regular rhythm and intact distal pulses.   Pulmonary/Chest: Effort normal and breath sounds normal.   Abdominal: Soft.   Skin: Skin is  warm. Capillary refill takes less than 2 seconds.   Psychiatric: She has a normal mood and affect.   Nursing note and vitals reviewed.      Assessment:       1. Rheumatoid arthritis, involving unspecified site, unspecified rheumatoid factor presence    2. Chronic nasal congestion    3. Arthralgia, unspecified joint    4. Cough        Plan:       PROBLEM LIST     Madison was seen today for hemorrhoids.    Diagnoses and all orders for this visit:    Rheumatoid arthritis, involving unspecified site, unspecified rheumatoid factor presence  -     Ambulatory Referral to Rheumatology  -     traMADol (ULTRAM) 50 mg tablet; Take one tab BiD as needed for pain  -     ibuprofen (ADVIL,MOTRIN) 800 MG tablet; Take 1 tablet (800 mg total) by mouth 2 (two) times daily as needed for Pain.    Chronic nasal congestion  Comments:  x 3 weeks on 9/30/19  Orders:  -     amoxicillin (AMOXIL) 500 MG Tab; Take 1 tablet (500 mg total) by mouth every 12 (twelve) hours. for 5 days  -     montelukast (SINGULAIR) 10 mg tablet; Take 1 tablet (10 mg total) by mouth once daily.    Arthralgia, unspecified joint  -     Ambulatory Referral to Rheumatology    Cough  -     dextromethorphan-guaifenesin  mg/5 ml (ROBITUSSIN-DM)  mg/5 mL liquid; Take 5 mLs by mouth every 6 (six) hours as needed (cough).

## 2019-09-30 NOTE — LETTER
September 30, 2019      [place] [address] [address] [address]  Phone: [phone]  Fax: [phone]       Patient: Madison Hill   YOB: 1965  Date of Visit: 09/30/2019    To Whom It May Concern:    Columba Hill  was at [facility] on 09/30/2019. Please excuse from 9/29/2019-9/30/2019. She may return to work/school on 10/1/2019 with no restrictions. If you have any questions or concerns, or if I can be of further assistance, please do not hesitate to contact me.    Sincerely,    Adrianna Hdz LPN

## 2019-10-07 PROBLEM — R05.9 COUGH: Status: ACTIVE | Noted: 2019-10-07

## 2019-12-30 ENCOUNTER — TELEPHONE (OUTPATIENT)
Dept: SURGERY | Facility: CLINIC | Age: 54
End: 2019-12-30

## 2019-12-30 NOTE — TELEPHONE ENCOUNTER
----- Message from Margarita Yen sent at 12/26/2019  8:18 AM CST -----  Contact: self [phone]  Patient needs a NP for back and breast pain. She has medicaid. Please call and advise.

## 2019-12-30 NOTE — TELEPHONE ENCOUNTER
----- Message from Alyssa Taylor sent at 12/24/2019 11:08 AM CST -----  Contact: [phone]/self   Patient is requesting to speak with nurse to schedule an appt concerning breast reduction surgery. Please call and advise.

## 2020-01-08 PROBLEM — M25.50 ARTHRALGIA: Status: RESOLVED | Noted: 2018-01-22 | Resolved: 2020-01-08

## 2020-01-08 PROBLEM — R05.9 COUGH: Status: RESOLVED | Noted: 2019-10-07 | Resolved: 2020-01-08

## 2020-01-10 ENCOUNTER — TELEPHONE (OUTPATIENT)
Dept: HEPATOLOGY | Facility: CLINIC | Age: 55
End: 2020-01-10

## 2020-01-10 NOTE — TELEPHONE ENCOUNTER
MA called patient to schedule her Initial visit to see liver specialist. She is unable to reached left her VM to please give us a callback.

## 2020-01-10 NOTE — TELEPHONE ENCOUNTER
----- Message from Sarah Christiansen MA sent at 1/9/2020  4:16 PM CST -----      ----- Message -----  From: Hugo Aguirre  Sent: 1/9/2020   4:05 PM CST  To: Ileana Dumont Staff    Pt need appt for Hepatitis B, Referral Order scanned in EPIC

## 2020-01-21 ENCOUNTER — TELEPHONE (OUTPATIENT)
Dept: GASTROENTEROLOGY | Facility: CLINIC | Age: 55
End: 2020-01-21

## 2020-01-23 ENCOUNTER — TELEPHONE (OUTPATIENT)
Dept: HEPATOLOGY | Facility: CLINIC | Age: 55
End: 2020-01-23

## 2020-01-23 NOTE — TELEPHONE ENCOUNTER
MA called patient back schedule her INitial visit to see liver specialist. She accepted 3/13 mailed appt reminder to patient.

## 2020-01-23 NOTE — TELEPHONE ENCOUNTER
----- Message from Hugo Aguirre sent at 1/23/2020  9:28 AM CST -----  Pt is waiting on your call to get scheduled

## 2020-02-11 ENCOUNTER — HOSPITAL ENCOUNTER (OUTPATIENT)
Dept: RADIOLOGY | Facility: HOSPITAL | Age: 55
Discharge: HOME | End: 2020-02-11
Attending: SURGERY
Payer: MEDICAID

## 2020-02-11 ENCOUNTER — HOSPITAL ENCOUNTER (OUTPATIENT)
Dept: PREADMISSION TESTING | Facility: HOSPITAL | Age: 55
Discharge: HOME | End: 2020-02-11
Attending: SURGERY
Payer: MEDICAID

## 2020-02-11 VITALS
TEMPERATURE: 98 F | SYSTOLIC BLOOD PRESSURE: 127 MMHG | OXYGEN SATURATION: 98 % | DIASTOLIC BLOOD PRESSURE: 78 MMHG | RESPIRATION RATE: 16 BRPM | HEART RATE: 70 BPM

## 2020-02-11 VITALS — WEIGHT: 197.06 LBS | HEIGHT: 65 IN | BODY MASS INDEX: 32.83 KG/M2

## 2020-02-11 DIAGNOSIS — B18.1 CHRONIC HEPATITIS B: ICD-10-CM

## 2020-02-11 DIAGNOSIS — K58.9 IRRITABLE BOWEL SYNDROME, UNSPECIFIED TYPE: ICD-10-CM

## 2020-02-11 DIAGNOSIS — K21.9 GASTROESOPHAGEAL REFLUX DISEASE WITHOUT ESOPHAGITIS: ICD-10-CM

## 2020-02-11 DIAGNOSIS — E78.5 HYPERLIPIDEMIA, UNSPECIFIED HYPERLIPIDEMIA TYPE: ICD-10-CM

## 2020-02-11 DIAGNOSIS — N62 LARGE BREASTS: ICD-10-CM

## 2020-02-11 DIAGNOSIS — F33.3 SEVERE EPISODE OF RECURRENT MAJOR DEPRESSIVE DISORDER, WITH PSYCHOTIC FEATURES: ICD-10-CM

## 2020-02-11 DIAGNOSIS — F17.200 SMOKING: ICD-10-CM

## 2020-02-11 DIAGNOSIS — M06.9 RHEUMATOID ARTHRITIS, INVOLVING UNSPECIFIED SITE, UNSPECIFIED RHEUMATOID FACTOR PRESENCE: ICD-10-CM

## 2020-02-11 DIAGNOSIS — Z01.818 PREOP TESTING: ICD-10-CM

## 2020-02-11 DIAGNOSIS — Z01.818 PREOP TESTING: Primary | ICD-10-CM

## 2020-02-11 PROBLEM — A59.9 TRICHOMONIASIS: Status: RESOLVED | Noted: 2019-07-19 | Resolved: 2020-02-11

## 2020-02-11 PROBLEM — A64 STD (FEMALE): Status: RESOLVED | Noted: 2019-07-19 | Resolved: 2020-02-11

## 2020-02-11 LAB
ALBUMIN SERPL BCP-MCNC: 3.8 G/DL (ref 3.5–5.2)
ALP SERPL-CCNC: 98 U/L (ref 55–135)
ALT SERPL W/O P-5'-P-CCNC: 22 U/L (ref 10–44)
ANION GAP SERPL CALC-SCNC: 7 MMOL/L (ref 8–16)
AST SERPL-CCNC: 18 U/L (ref 10–40)
BASOPHILS # BLD AUTO: 0.02 K/UL (ref 0–0.2)
BASOPHILS NFR BLD: 0.4 % (ref 0–1.9)
BILIRUB SERPL-MCNC: 0.3 MG/DL (ref 0.1–1)
BUN SERPL-MCNC: 12 MG/DL (ref 6–20)
CALCIUM SERPL-MCNC: 9.5 MG/DL (ref 8.7–10.5)
CHLORIDE SERPL-SCNC: 107 MMOL/L (ref 95–110)
CO2 SERPL-SCNC: 24 MMOL/L (ref 23–29)
CREAT SERPL-MCNC: 0.7 MG/DL (ref 0.5–1.4)
DIFFERENTIAL METHOD: ABNORMAL
EOSINOPHIL # BLD AUTO: 0.1 K/UL (ref 0–0.5)
EOSINOPHIL NFR BLD: 1.5 % (ref 0–8)
ERYTHROCYTE [DISTWIDTH] IN BLOOD BY AUTOMATED COUNT: 16 % (ref 11.5–14.5)
EST. GFR  (AFRICAN AMERICAN): >60 ML/MIN/1.73 M^2
EST. GFR  (NON AFRICAN AMERICAN): >60 ML/MIN/1.73 M^2
GLUCOSE SERPL-MCNC: 97 MG/DL (ref 70–110)
HCT VFR BLD AUTO: 40.6 % (ref 37–48.5)
HGB BLD-MCNC: 12.9 G/DL (ref 12–16)
IMM GRANULOCYTES # BLD AUTO: 0.01 K/UL (ref 0–0.04)
IMM GRANULOCYTES NFR BLD AUTO: 0.2 % (ref 0–0.5)
INR PPP: 1 (ref 0.8–1.2)
LYMPHOCYTES # BLD AUTO: 2 K/UL (ref 1–4.8)
LYMPHOCYTES NFR BLD: 36.3 % (ref 18–48)
MCH RBC QN AUTO: 24.3 PG (ref 27–31)
MCHC RBC AUTO-ENTMCNC: 31.8 G/DL (ref 32–36)
MCV RBC AUTO: 77 FL (ref 82–98)
MONOCYTES # BLD AUTO: 0.3 K/UL (ref 0.3–1)
MONOCYTES NFR BLD: 5.9 % (ref 4–15)
NEUTROPHILS # BLD AUTO: 3 K/UL (ref 1.8–7.7)
NEUTROPHILS NFR BLD: 55.9 % (ref 38–73)
NRBC BLD-RTO: 0 /100 WBC
PLATELET # BLD AUTO: 208 K/UL (ref 150–350)
PMV BLD AUTO: 10.6 FL (ref 9.2–12.9)
POTASSIUM SERPL-SCNC: 3.9 MMOL/L (ref 3.5–5.1)
PROT SERPL-MCNC: 8 G/DL (ref 6–8.4)
PROTHROMBIN TIME: 10.7 SEC (ref 9–12.5)
RBC # BLD AUTO: 5.3 M/UL (ref 4–5.4)
SODIUM SERPL-SCNC: 138 MMOL/L (ref 136–145)
WBC # BLD AUTO: 5.42 K/UL (ref 3.9–12.7)

## 2020-02-11 PROCEDURE — 77063 BREAST TOMOSYNTHESIS BI: CPT | Mod: 26,,, | Performed by: RADIOLOGY

## 2020-02-11 PROCEDURE — 85610 PROTHROMBIN TIME: CPT

## 2020-02-11 PROCEDURE — 85025 COMPLETE CBC W/AUTO DIFF WBC: CPT

## 2020-02-11 PROCEDURE — 80053 COMPREHEN METABOLIC PANEL: CPT

## 2020-02-11 PROCEDURE — 77063 MAMMO DIGITAL SCREENING BILAT WITH TOMOSYNTHESIS_CAD: ICD-10-PCS | Mod: 26,,, | Performed by: RADIOLOGY

## 2020-02-11 PROCEDURE — 93010 EKG 12-LEAD: ICD-10-PCS | Mod: ,,, | Performed by: INTERNAL MEDICINE

## 2020-02-11 PROCEDURE — 77067 SCR MAMMO BI INCL CAD: CPT | Mod: 26,,, | Performed by: RADIOLOGY

## 2020-02-11 PROCEDURE — 93010 ELECTROCARDIOGRAM REPORT: CPT | Mod: ,,, | Performed by: INTERNAL MEDICINE

## 2020-02-11 PROCEDURE — 77067 MAMMO DIGITAL SCREENING BILAT WITH TOMOSYNTHESIS_CAD: ICD-10-PCS | Mod: 26,,, | Performed by: RADIOLOGY

## 2020-02-11 PROCEDURE — 93005 ELECTROCARDIOGRAM TRACING: CPT

## 2020-02-11 PROCEDURE — 77067 SCR MAMMO BI INCL CAD: CPT | Mod: TC

## 2020-02-11 RX ORDER — ALPRAZOLAM 0.5 MG/1
0.5 TABLET ORAL ONCE AS NEEDED
Status: CANCELLED | OUTPATIENT
Start: 2020-02-11 | End: 2031-07-10

## 2020-02-11 RX ORDER — SODIUM CHLORIDE, SODIUM LACTATE, POTASSIUM CHLORIDE, CALCIUM CHLORIDE 600; 310; 30; 20 MG/100ML; MG/100ML; MG/100ML; MG/100ML
INJECTION, SOLUTION INTRAVENOUS
Status: CANCELLED | OUTPATIENT
Start: 2020-02-11 | End: 2020-02-11

## 2020-02-11 RX ORDER — ACETAMINOPHEN 500 MG
1000 TABLET ORAL
Status: CANCELLED | OUTPATIENT
Start: 2020-02-11 | End: 2020-02-11

## 2020-02-11 RX ORDER — FAMOTIDINE 20 MG/1
20 TABLET, FILM COATED ORAL
Status: CANCELLED | OUTPATIENT
Start: 2020-02-11 | End: 2020-02-11

## 2020-02-11 NOTE — ASSESSMENT & PLAN NOTE
Pt was hospitalized for SI/HI and depression 10/2018   She reports she is off all medications   She also carries the diagnosis of Schizophrenia -pt denies Schizophrenia   She denies auditory/visual hallucinations, SI, HI  On exam, pt is impulsive and easily distracted-unsure of baseline   Overall, pt appears stable

## 2020-02-11 NOTE — H&P
Preoperative History and Physical                                                             Hospital Medicine      Chief Complaint: Preoperative evaluation     History of Present Illness:      Madison Hill is a 54 y.o. female with hx Depression, Schizophrenia, Cocaine use, Hep B, HLD, GERD,and chronic back pain who presents to the office today for a preoperative consultation at the request of Dr. Fox who plans on performing Bilateral mammoplasty on March 3.     Functional Status:      The patient is able to climb a flight of stairs. The patient is able to ambulate 3 blocks without difficulty. The patient's functional status is affected by the surgical problem. The patient's functional status is not affected by shortness of breath, chest pain, dyspnea on exertion and fatigue.    MET score greater than 4    Past Medical History:      Past Medical History:   Diagnosis Date    Anxiety     Arthritis     Depression     GERD (gastroesophageal reflux disease)     Hepatitis b     HBV    Hyperlipidemia     IBS (irritable bowel syndrome)     Liver fibrosis stage 2    Sleep apnea         Past Surgical History:      Past Surgical History:   Procedure Laterality Date    CHOLECYSTECTOMY  2018    HYSTERECTOMY      LIVER BIOPSY  10/16/2014    Stage 2 fibrosis        Social History:      Social History     Socioeconomic History    Marital status: Single     Spouse name: Not on file    Number of children: Not on file    Years of education: Not on file    Highest education level: Not on file   Occupational History    Not on file   Social Needs    Financial resource strain: Not on file    Food insecurity:     Worry: Not on file     Inability: Not on file    Transportation needs:     Medical: Not on file     Non-medical: Not on file   Tobacco Use    Smoking status: Light Tobacco Smoker     Packs/day: 0.50     Years: 45.00     Pack years: 22.50     Types: Cigarettes     Smokeless tobacco: Never Used   Substance and Sexual Activity    Alcohol use: Yes     Alcohol/week: 0.0 standard drinks     Frequency: 2-4 times a month     Drinks per session: 1 or 2    Drug use: Not Currently    Sexual activity: Never   Lifestyle    Physical activity:     Days per week: Not on file     Minutes per session: Not on file    Stress: Not on file   Relationships    Social connections:     Talks on phone: Not on file     Gets together: Not on file     Attends Evangelical service: Not on file     Active member of club or organization: Not on file     Attends meetings of clubs or organizations: Not on file     Relationship status: Not on file   Other Topics Concern    Not on file   Social History Narrative    Not on file        Family History:      Family History   Problem Relation Age of Onset    Hyperlipidemia Mother     Hypertension Mother     No Known Problems Father     Hyperlipidemia Sister     Hyperlipidemia Daughter     Diabetes Maternal Grandmother     Colon cancer Neg Hx        Allergies:      Review of patient's allergies indicates:   Allergen Reactions    Corticosteroids (glucocorticoids) Palpitations    Morphine Anxiety       Medications:      Current Outpatient Medications   Medication Sig    atorvastatin (LIPITOR) 80 MG tablet Take 1 tablet (80 mg total) by mouth once daily.    linaCLOtide (LINZESS) 290 mcg Cap capsule Take 1 capsule (290 mcg total) by mouth once daily.    omeprazole (PRILOSEC) 20 MG capsule Take 20 mg by mouth once daily.    tenofovir (VIREAD) 300 mg Tab Take 1 tablet (300 mg total) by mouth once daily.     No current facility-administered medications for this encounter.        Vitals:      Vitals:    02/11/20 1135   BP: 127/78   Pulse: 70   Resp: 16   Temp: 97.8 °F (36.6 °C)       Review of Systems:        Constitutional: Negative for fever, chills, weight loss, malaise/fatigue and diaphoresis.   HENT: Negative for hearing loss, ear pain, nosebleeds,  congestion, sore throat, neck pain, tinnitus and ear discharge.    Eyes: Negative for blurred vision, double vision, photophobia, pain, discharge and redness.   Respiratory: +snoring Negative for cough, hemoptysis, sputum production, shortness of breath, wheezing and stridor.    Cardiovascular: Negative for chest pain, palpitations, orthopnea, claudication, leg swelling and PND.   Gastrointestinal: Negative for heartburn, nausea, vomiting, abdominal pain, diarrhea, constipation, blood in stool and melena.   Genitourinary: Negative for dysuria, urgency, frequency, hematuria and flank pain.   Musculoskeletal: +chronic neck, thoracic and lower back pain Negative for myalgias, back pain, joint pain and falls.   Skin: Negative for itching and rash.   Neurological: Negative for dizziness, tingling, tremors, sensory change, speech change, focal weakness, seizures, loss of consciousness, weakness and headaches.   Endo/Heme/Allergies: Negative for environmental allergies and polydipsia. Does not bruise/bleed easily.   Psychiatric/Behavioral: +pt denies any depression or schizophrenia - reports resolved, Denies SI/HI, Denies any recent cocaine use Negative for depression, suicidal ideas, hallucinations, memory loss and substance abuse. The patient is not nervous/anxious and does not have insomnia.    All 14 systems reviewed and negative except as noted above.    Physical Exam:      Constitutional: Appears well-developed, well-nourished and in no acute distress.  Patient is oriented to person, place, and time.   Head: Normocephalic and atraumatic. Mucous membranes moist.  Neck: Neck supple no mass.   Cardiovascular: Normal rate and regular rhythm.  S1 S2 appreciated by ascultation.  Pulmonary/Chest: Effort normal and clear to auscultation bilaterally. No respiratory distress.   Abdomen: Soft. Non-tender and non-distended. Bowel sounds are normal.   Neurological: Patient is alert and oriented to person, place and time. Moves  all extremities.  Skin: Warm and dry. No lesions.  Extremities: No clubbing, cyanosis or edema.  Psychiatric: Fidgety, easily distracted, impulsive   Laboratory data:      Reviewed and noted in plan where applicable. Please see chart for full laboratory data.    No results for input(s): CPK, CPKMB, TROPONINI, MB in the last 24 hours. No results for input(s): POCTGLUCOSE in the last 24 hours.     Lab Results   Component Value Date    INR 1.0 2020    INR 1.0 10/07/2014       Lab Results   Component Value Date    WBC 5.42 2020    HGB 12.9 2020    HCT 40.6 2020    MCV 77 (L) 2020     2020       Recent Labs   Lab 20  1135   GLU 97      K 3.9      CO2 24   BUN 12   CREATININE 0.7   CALCIUM 9.5       Predictors of intubation difficulty:       Morbid obesity? no   Anatomically abnormal facies? no   Prominent incisors? no   Receding mandible? no   Short, thick neck? no   Neck range of motion: normal   Dentition: Missing teeth    Cardiographics:      EC20   Normal sinus rhythm  Normal ECG  When compared with ECG of 08-OCT-2018 06:49,  Inverted T waves have replaced nonspecific T wave abnormality in Inferior leads  QT has shortened    Echocardiogram: 18  CONCLUSIONS     1 - Concentric remodeling.     2 - No wall motion abnormalities.     3 - Normal left ventricular systolic function (EF 65-70%).     4 - Normal left ventricular diastolic function.     5 - Normal right ventricular systolic function .     Stress test 18  free of evidence for myocardial ischemia or injury    Imaging:      Chest x-ray: 10/8/18  There is mild cardiomegaly.  There is no evidence of an acute pulmonary process.  There is no pneumothorax or pleural effusion.  Assessment and Plan:      Large breasts  The patient is scheduled for a Bilateral mammoplasty reduction 3/3/20 by Dr. Fox     Known risk factors for perioperative complications:     Cocaine use   Tobacco abuse      Difficulty with intubation is not anticipated.    Cardiac Risk Estimation: low intraoperative cardiac risk- depending on UDS if positive for cocaine     1.) Preoperative workup as follows: ECG, hemoglobin, hematocrit, electrolytes, creatinine, glucose, liver function studies, urinalysis (urinary tract instrumentation planned), UDS.  2.) Change in medication regimen before surgery: none  3.) Prophylaxis for cardiac events with perioperative beta-blockers: not indicated.  4.) Invasive hemodynamic monitoring perioperatively: not indicated.  5.) Deep vein thrombosis prophylaxis postoperatively: regimen to be chosen by surgical team.  6.) Surveillance for postoperative MI with ECG immediately postoperatively and on postoperati ve days 1 and 2 AND troponin levels 24 hours postoperatively and on day 4 or hospital discharge (whichever comes first): not indicated.  7.) Current medications which may produce withdrawal symptoms if withheld perioperatively: none   8.) Other measures: Preoperative tobacco abstention, Preoperative cocaine cessation     Chronic hepatitis B  Followed by Hepatology  Stage 2 Fibrosis- no cirrhosis   Cont Tenofovir     Gastroesophageal reflux disease without esophagitis  Cont prilosec    Smoking  Preoperative tobacco cessation education provided     Cocaine addiction  Pt denies continued use   Last +UDS was 2018  Will repeat UDS    Hyperlipidemia  Stable   Cont Statin     Depression  Pt was hospitalized for SI/HI and depression 10/2018   She reports she is off all medications   She also carries the diagnosis of Schizophrenia -pt denies Schizophrenia   She denies auditory/visual hallucinations, SI, HI  On exam, pt is impulsive and easily distracted-unsure of baseline   Overall, pt appears stable     Rheumatoid arthritis  Pt had a +RF in the past. CRP/ESR WNL  Followed by Rheumatology last seen 1/2018   DX Arthralgias   Rheumatology has not diagnosed RA  Pt is not on DMARDS     IBS (irritable bowel  syndrome)  Cont Linzess     Snoring  Pt denies the dx of ABHILASH  Pt reports snoring with low to intermediate risk for ABHILASH

## 2020-02-11 NOTE — ASSESSMENT & PLAN NOTE
The patient is scheduled for a Bilateral mammoplasty reduction 3/3/20 by Dr. Fox     Known risk factors for perioperative complications:     Cocaine use   Tobacco abuse     Difficulty with intubation is not anticipated.    Cardiac Risk Estimation: low intraoperative cardiac risk- depending on UDS if positive for cocaine     1.) Preoperative workup as follows: ECG, hemoglobin, hematocrit, electrolytes, creatinine, glucose, liver function studies, urinalysis (urinary tract instrumentation planned), UDS.  2.) Change in medication regimen before surgery: none  3.) Prophylaxis for cardiac events with perioperative beta-blockers: not indicated.  4.) Invasive hemodynamic monitoring perioperatively: not indicated.  5.) Deep vein thrombosis prophylaxis postoperatively: regimen to be chosen by surgical team.  6.) Surveillance for postoperative MI with ECG immediately postoperatively and on postoperati ve days 1 and 2 AND troponin levels 24 hours postoperatively and on day 4 or hospital discharge (whichever comes first): not indicated.  7.) Current medications which may produce withdrawal symptoms if withheld perioperatively: none   8.) Other measures: Preoperative tobacco abstention, Preoperative cocaine cessation

## 2020-02-11 NOTE — ASSESSMENT & PLAN NOTE
Pt had a +RF in the past. CRP/ESR WNL  Followed by Rheumatology last seen 1/2018   DX Arthralgias   Rheumatology has not diagnosed RA  Pt is not on DMARDS

## 2020-02-11 NOTE — DISCHARGE INSTRUCTIONS
To confirm, Your doctor has instructed you that surgery is scheduled for 03/03/2020.       Please report to [facility].  Pre admit office will call afternoon prior to surgery with final arrival time    INSTRUCTIONS IMPORTANT!!!   Do not eat, drink, or smoke after 12 midnight-including water. OK to brush teeth, no gum, candy or mints!    ¨ Take only these medicines with a small swallow of water-morning of surgery.  [facility]    Pre operative instructions:  Please review the Pre-Operative Instruction booklet that you were given.        Bathing Instructions--See page 6 in the Pre-operative booklet.      Prevention of surgical site infections:     -Keep incisions clean and dry.   -Do not soak/submerge incisions in water until completely healed.   -Do not apply lotions, powders, creams, or deodorants to site.   -Always make sure hands are cleaned with antibacterial soap/ alcohol-based                 prior to touching the surgical site.  (This includes doctors,                 nurses, staff, and yourself.)    Signs and symptoms:   -Redness and pain around the area where you had surgery   -Drainage of cloudy fluid from your surgical wound   -Fever over 100.4       I have read or had read and explained to me, and understand the above information.  Additional comments or instructions:  Received a copy of Pre-operative instructions booklet, FAQ surgical site infection sheet, and packets of hibiclens (if indicated).  HOLD 24 HOURS PRIOR TO SURGERYTo confirm, Your doctor has instructed you that surgery is scheduled for *** at  ***.       Please report to [facility] by ***.  Pre admit office will call afternoon prior to surgery with final arrival time    INSTRUCTIONS IMPORTANT!!!   Do not eat, drink, or smoke after 12 midnight-including water. OK to brush teeth, no gum, candy or mints!    ¨ Take only these medicines with a small swallow of water-morning of surgery.  ***    Pre  operative instructions:  Please review the Pre-Operative Instruction booklet that you were given.        Bathing Instructions--See page 6 in the Pre-operative booklet.      Prevention of surgical site infections:     -Keep incisions clean and dry.   -Do not soak/submerge incisions in water until completely healed.   -Do not apply lotions, powders, creams, or deodorants to site.   -Always make sure hands are cleaned with antibacterial soap/ alcohol-based                 prior to touching the surgical site.  (This includes doctors,                 nurses, staff, and yourself.)    Signs and symptoms:   -Redness and pain around the area where you had surgery   -Drainage of cloudy fluid from your surgical wound   -Fever over 100.4       I have read or had read and explained to me, and understand the above information.  Additional comments or instructions:  Received a copy of Pre-operative instructions booklet, FAQ surgical site infection sheet, and packets of hibiclens (if indicated).

## 2020-02-18 ENCOUNTER — TELEPHONE (OUTPATIENT)
Dept: HEPATOLOGY | Facility: CLINIC | Age: 55
End: 2020-02-18

## 2020-02-18 NOTE — TELEPHONE ENCOUNTER
MA called patient inform her that we have to move her appt in the morning she understood. Mailed appt reminder to patient.

## 2020-02-24 ENCOUNTER — ANESTHESIA EVENT (OUTPATIENT)
Dept: SURGERY | Facility: HOSPITAL | Age: 55
End: 2020-02-24
Payer: MEDICAID

## 2020-02-24 PROBLEM — E66.9 OBESITY (BMI 30.0-34.9): Chronic | Status: ACTIVE | Noted: 2018-02-05

## 2020-02-24 PROBLEM — F17.200 SMOKING: Chronic | Status: ACTIVE | Noted: 2018-02-05

## 2020-02-24 PROBLEM — E78.5 HYPERLIPIDEMIA: Chronic | Status: ACTIVE | Noted: 2018-02-05

## 2020-02-24 PROBLEM — K58.9 IBS (IRRITABLE BOWEL SYNDROME): Chronic | Status: ACTIVE | Noted: 2020-02-11

## 2020-02-24 NOTE — ANESTHESIA PREPROCEDURE EVALUATION
02/24/2020  Madison Hill is a 54 y.o., female.    Anesthesia Evaluation    I have reviewed the Patient Summary Reports.    I have reviewed the Nursing Notes.   I have reviewed the Medications.     Review of Systems  Anesthesia Hx:  History of prior surgery of interest to airway management or planning: Previous anesthesia: General 5/17/2018 - lap cholecystectomy with general anesthesia.  Airway issues documented on chart review include GETA   Denies Personal Hx of Anesthesia complications.   Social:  Smoker, Alcohol Use  Illicit Drug Use: Types of drugs include Cocaine,   Cardiovascular:   hyperlipidemia ECG has been reviewed. Normal nuclear stress test 2018 Cardiovascular Symptoms: Chest Pain     Pulmonary:   Denies Asthma.  Denies Recent URI.  Denies Sleep Apnea.    Hepatic/GI:   GERD, well controlled  Liver Disease, Hepatitis, Viral Hepatitis Type B    Musculoskeletal:   Arthritis     Psych:   anxiety depression  Psychotic Disorder and Schizophrenia.          Physical Exam  General:  Obesity    Airway/Jaw/Neck:  Airway Findings: Mouth Opening: Normal Tongue: Large  General Airway Assessment: Adult  Mallampati: III  TM Distance: 4 - 6 cm  Jaw/Neck Findings:  Neck ROM: Extension Decreased, Mild      Dental:  Dental Findings:         Mental Status:  Mental Status Findings:  Cooperative, Alert and Oriented         Anesthesia Plan  Type of Anesthesia, risks & benefits discussed:  Anesthesia Type:  general  Patient's Preference:   Intra-op Monitoring Plan: standard ASA monitors  Intra-op Monitoring Plan Comments:   Post Op Pain Control Plan: multimodal analgesia  Post Op Pain Control Plan Comments:   Induction:   IV and rapid sequence  Beta Blocker:  Patient is not currently on a Beta-Blocker (No further documentation required).       Informed Consent: Patient understands risks and agrees with Anesthesia plan.   Questions answered. Anesthesia consent signed with patient.  ASA Score: 2     Day of Surgery Review of History & Physical:    H&P update referred to the surgeon.         Ready For Surgery From Anesthesia Perspective.

## 2020-02-27 ENCOUNTER — TELEPHONE (OUTPATIENT)
Dept: HEPATOLOGY | Facility: CLINIC | Age: 55
End: 2020-02-27

## 2020-02-27 NOTE — TELEPHONE ENCOUNTER
----- Message from Doris Celeste MA sent at 2/24/2020  4:28 PM CST -----  Contact: Self       ----- Message -----  From: Rosanne Pepper LPN  Sent: 2/24/2020   8:41 AM CST  To: Clint Renteria Staff        ----- Message -----  From: Yuly Carter  Sent: 2/24/2020   8:28 AM CST  To: Cherelle Power Staff    Patient states she missed a call last week. Phone number is [phone].

## 2020-02-27 NOTE — TELEPHONE ENCOUNTER
MA called patient back, inform her of the change on her schedule. Mailed appt reminder to patient.

## 2020-03-03 ENCOUNTER — ANESTHESIA (OUTPATIENT)
Dept: SURGERY | Facility: HOSPITAL | Age: 55
End: 2020-03-03
Payer: MEDICAID

## 2020-03-03 ENCOUNTER — HOSPITAL ENCOUNTER (OUTPATIENT)
Facility: HOSPITAL | Age: 55
Discharge: HOME | End: 2020-03-03
Attending: SURGERY | Admitting: SURGERY
Payer: MEDICAID

## 2020-03-03 ENCOUNTER — NURSE TRIAGE (OUTPATIENT)
Dept: ADMINISTRATIVE | Facility: CLINIC | Age: 55
End: 2020-03-03

## 2020-03-03 DIAGNOSIS — N62 SYMPTOMATIC MAMMARY HYPERTROPHY: Primary | ICD-10-CM

## 2020-03-03 PROCEDURE — 25000003 PHARM REV CODE 250: Performed by: ANESTHESIOLOGY

## 2020-03-03 PROCEDURE — 71000033 HC RECOVERY, INTIAL HOUR: Performed by: SURGERY

## 2020-03-03 PROCEDURE — 63600175 PHARM REV CODE 636 W HCPCS: Performed by: ANESTHESIOLOGY

## 2020-03-03 PROCEDURE — 25000003 PHARM REV CODE 250: Performed by: SURGERY

## 2020-03-03 PROCEDURE — D9220A PRA ANESTHESIA: ICD-10-PCS | Mod: CRNA,,, | Performed by: NURSE ANESTHETIST, CERTIFIED REGISTERED

## 2020-03-03 PROCEDURE — 71000039 HC RECOVERY, EACH ADD'L HOUR: Performed by: SURGERY

## 2020-03-03 PROCEDURE — 37000009 HC ANESTHESIA EA ADD 15 MINS: Performed by: SURGERY

## 2020-03-03 PROCEDURE — 88305 TISSUE EXAM BY PATHOLOGIST: CPT | Performed by: PATHOLOGY

## 2020-03-03 PROCEDURE — C1729 CATH, DRAINAGE: HCPCS | Performed by: SURGERY

## 2020-03-03 PROCEDURE — 36000706: Performed by: SURGERY

## 2020-03-03 PROCEDURE — 88305 TISSUE EXAM BY PATHOLOGIST: ICD-10-PCS | Mod: 26,,, | Performed by: PATHOLOGY

## 2020-03-03 PROCEDURE — 71000015 HC POSTOP RECOV 1ST HR: Performed by: SURGERY

## 2020-03-03 PROCEDURE — 37000008 HC ANESTHESIA 1ST 15 MINUTES: Performed by: SURGERY

## 2020-03-03 PROCEDURE — 63600175 PHARM REV CODE 636 W HCPCS: Performed by: NURSE ANESTHETIST, CERTIFIED REGISTERED

## 2020-03-03 PROCEDURE — 00402 ANES INTEG SYS RCNSTV BREAST: CPT | Performed by: SURGERY

## 2020-03-03 PROCEDURE — 25000003 PHARM REV CODE 250: Performed by: NURSE PRACTITIONER

## 2020-03-03 PROCEDURE — 88305 TISSUE EXAM BY PATHOLOGIST: CPT | Mod: 26,,, | Performed by: PATHOLOGY

## 2020-03-03 PROCEDURE — D9220A PRA ANESTHESIA: Mod: CRNA,,, | Performed by: NURSE ANESTHETIST, CERTIFIED REGISTERED

## 2020-03-03 PROCEDURE — D9220A PRA ANESTHESIA: ICD-10-PCS | Mod: ANES,,, | Performed by: ANESTHESIOLOGY

## 2020-03-03 PROCEDURE — 36000707: Performed by: SURGERY

## 2020-03-03 PROCEDURE — D9220A PRA ANESTHESIA: Mod: ANES,,, | Performed by: ANESTHESIOLOGY

## 2020-03-03 PROCEDURE — 25000003 PHARM REV CODE 250: Performed by: NURSE ANESTHETIST, CERTIFIED REGISTERED

## 2020-03-03 PROCEDURE — 71000016 HC POSTOP RECOV ADDL HR: Performed by: SURGERY

## 2020-03-03 RX ORDER — LIDOCAINE HYDROCHLORIDE 10 MG/ML
0.5 INJECTION, SOLUTION EPIDURAL; INFILTRATION; INTRACAUDAL; PERINEURAL ONCE
Status: DISCONTINUED | OUTPATIENT
Start: 2020-03-03 | End: 2020-03-03

## 2020-03-03 RX ORDER — BUPIVACAINE HYDROCHLORIDE AND EPINEPHRINE 2.5; 5 MG/ML; UG/ML
INJECTION, SOLUTION EPIDURAL; INFILTRATION; INTRACAUDAL; PERINEURAL
Status: DISCONTINUED
Start: 2020-03-03 | End: 2020-03-03 | Stop reason: HOSPADM

## 2020-03-03 RX ORDER — ONDANSETRON 2 MG/ML
INJECTION INTRAMUSCULAR; INTRAVENOUS
Status: DISCONTINUED | OUTPATIENT
Start: 2020-03-03 | End: 2020-03-03

## 2020-03-03 RX ORDER — LIDOCAINE HYDROCHLORIDE 10 MG/ML
1 INJECTION, SOLUTION EPIDURAL; INFILTRATION; INTRACAUDAL; PERINEURAL ONCE
Status: DISCONTINUED | OUTPATIENT
Start: 2020-03-03 | End: 2020-03-03

## 2020-03-03 RX ORDER — SUCCINYLCHOLINE CHLORIDE 20 MG/ML
INJECTION INTRAMUSCULAR; INTRAVENOUS
Status: DISCONTINUED | OUTPATIENT
Start: 2020-03-03 | End: 2020-03-03

## 2020-03-03 RX ORDER — PROPOFOL 10 MG/ML
VIAL (ML) INTRAVENOUS
Status: DISCONTINUED | OUTPATIENT
Start: 2020-03-03 | End: 2020-03-03

## 2020-03-03 RX ORDER — SODIUM CHLORIDE, SODIUM LACTATE, POTASSIUM CHLORIDE, CALCIUM CHLORIDE 600; 310; 30; 20 MG/100ML; MG/100ML; MG/100ML; MG/100ML
INJECTION, SOLUTION INTRAVENOUS CONTINUOUS
Status: ACTIVE | OUTPATIENT
Start: 2020-03-03 | End: 2020-03-03

## 2020-03-03 RX ORDER — CHLORHEXIDINE GLUCONATE ORAL RINSE 1.2 MG/ML
10 SOLUTION DENTAL
Status: DISCONTINUED | OUTPATIENT
Start: 2020-03-03 | End: 2020-03-03 | Stop reason: HOSPADM

## 2020-03-03 RX ORDER — DEXAMETHASONE SODIUM PHOSPHATE 4 MG/ML
INJECTION, SOLUTION INTRA-ARTICULAR; INTRALESIONAL; INTRAMUSCULAR; INTRAVENOUS; SOFT TISSUE
Status: DISCONTINUED | OUTPATIENT
Start: 2020-03-03 | End: 2020-03-03

## 2020-03-03 RX ORDER — SODIUM CHLORIDE, SODIUM LACTATE, POTASSIUM CHLORIDE, CALCIUM CHLORIDE 600; 310; 30; 20 MG/100ML; MG/100ML; MG/100ML; MG/100ML
INJECTION, SOLUTION INTRAVENOUS CONTINUOUS
Status: ACTIVE | OUTPATIENT
Start: 2020-03-03 | End: 2020-03-04

## 2020-03-03 RX ORDER — GLYCOPYRROLATE 0.2 MG/ML
INJECTION INTRAMUSCULAR; INTRAVENOUS
Status: DISCONTINUED | OUTPATIENT
Start: 2020-03-03 | End: 2020-03-03

## 2020-03-03 RX ORDER — ROCURONIUM BROMIDE 10 MG/ML
INJECTION, SOLUTION INTRAVENOUS
Status: DISCONTINUED | OUTPATIENT
Start: 2020-03-03 | End: 2020-03-03

## 2020-03-03 RX ORDER — SODIUM CHLORIDE 0.9 % (FLUSH) 0.9 %
10 SYRINGE (ML) INJECTION
Status: DISCONTINUED | OUTPATIENT
Start: 2020-03-03 | End: 2020-03-03

## 2020-03-03 RX ORDER — MIDAZOLAM HYDROCHLORIDE 1 MG/ML
INJECTION, SOLUTION INTRAMUSCULAR; INTRAVENOUS
Status: DISCONTINUED | OUTPATIENT
Start: 2020-03-03 | End: 2020-03-03

## 2020-03-03 RX ORDER — LIDOCAINE HCL/PF 100 MG/5ML
SYRINGE (ML) INTRAVENOUS
Status: DISCONTINUED | OUTPATIENT
Start: 2020-03-03 | End: 2020-03-03

## 2020-03-03 RX ORDER — FENTANYL CITRATE 50 UG/ML
25 INJECTION, SOLUTION INTRAMUSCULAR; INTRAVENOUS EVERY 5 MIN PRN
Status: DISCONTINUED | OUTPATIENT
Start: 2020-03-03 | End: 2020-03-03 | Stop reason: HOSPADM

## 2020-03-03 RX ORDER — BUPIVACAINE HYDROCHLORIDE AND EPINEPHRINE 2.5; 5 MG/ML; UG/ML
INJECTION, SOLUTION EPIDURAL; INFILTRATION; INTRACAUDAL; PERINEURAL
Status: DISCONTINUED | OUTPATIENT
Start: 2020-03-03 | End: 2020-03-03 | Stop reason: HOSPADM

## 2020-03-03 RX ORDER — NEOSTIGMINE METHYLSULFATE 1 MG/ML
INJECTION, SOLUTION INTRAVENOUS
Status: DISCONTINUED | OUTPATIENT
Start: 2020-03-03 | End: 2020-03-03

## 2020-03-03 RX ORDER — PHENYLEPHRINE HYDROCHLORIDE 10 MG/ML
INJECTION INTRAVENOUS
Status: DISCONTINUED | OUTPATIENT
Start: 2020-03-03 | End: 2020-03-03

## 2020-03-03 RX ORDER — ALPRAZOLAM 0.5 MG/1
0.5 TABLET ORAL ONCE AS NEEDED
Status: COMPLETED | OUTPATIENT
Start: 2020-03-03 | End: 2020-03-03

## 2020-03-03 RX ORDER — FENTANYL CITRATE 50 UG/ML
INJECTION, SOLUTION INTRAMUSCULAR; INTRAVENOUS
Status: DISCONTINUED | OUTPATIENT
Start: 2020-03-03 | End: 2020-03-03

## 2020-03-03 RX ORDER — OXYCODONE HYDROCHLORIDE 5 MG/1
5 TABLET ORAL ONCE AS NEEDED
Status: COMPLETED | OUTPATIENT
Start: 2020-03-03 | End: 2020-03-03

## 2020-03-03 RX ORDER — MEPERIDINE HYDROCHLORIDE 25 MG/ML
12.5 INJECTION INTRAMUSCULAR; INTRAVENOUS; SUBCUTANEOUS EVERY 10 MIN PRN
Status: DISCONTINUED | OUTPATIENT
Start: 2020-03-03 | End: 2020-03-03 | Stop reason: HOSPADM

## 2020-03-03 RX ORDER — FAMOTIDINE 20 MG/1
40 TABLET, FILM COATED ORAL
Status: COMPLETED | OUTPATIENT
Start: 2020-03-03 | End: 2020-03-03

## 2020-03-03 RX ORDER — NAPROXEN 250 MG/1
500 TABLET ORAL ONCE
Status: COMPLETED | OUTPATIENT
Start: 2020-03-03 | End: 2020-03-03

## 2020-03-03 RX ADMIN — FENTANYL CITRATE 50 MCG: 50 INJECTION, SOLUTION INTRAMUSCULAR; INTRAVENOUS at 08:03

## 2020-03-03 RX ADMIN — CEFAZOLIN 2 G: 330 INJECTION, POWDER, FOR SOLUTION INTRAMUSCULAR; INTRAVENOUS at 06:03

## 2020-03-03 RX ADMIN — FAMOTIDINE 40 MG: 20 TABLET ORAL at 05:03

## 2020-03-03 RX ADMIN — ALPRAZOLAM 0.5 MG: 0.5 TABLET ORAL at 06:03

## 2020-03-03 RX ADMIN — MIDAZOLAM 2 MG: 1 INJECTION INTRAMUSCULAR; INTRAVENOUS at 06:03

## 2020-03-03 RX ADMIN — PHENYLEPHRINE HYDROCHLORIDE 100 MCG: 10 INJECTION INTRAVENOUS at 07:03

## 2020-03-03 RX ADMIN — FENTANYL CITRATE 25 MCG: 50 INJECTION, SOLUTION INTRAMUSCULAR; INTRAVENOUS at 10:03

## 2020-03-03 RX ADMIN — FENTANYL CITRATE 50 MCG: 50 INJECTION, SOLUTION INTRAMUSCULAR; INTRAVENOUS at 07:03

## 2020-03-03 RX ADMIN — ROCURONIUM BROMIDE 5 MG: 10 INJECTION, SOLUTION INTRAVENOUS at 07:03

## 2020-03-03 RX ADMIN — FENTANYL CITRATE 100 MCG: 50 INJECTION, SOLUTION INTRAMUSCULAR; INTRAVENOUS at 07:03

## 2020-03-03 RX ADMIN — PROPOFOL 140 MG: 10 INJECTION, EMULSION INTRAVENOUS at 07:03

## 2020-03-03 RX ADMIN — ONDANSETRON 4 MG: 2 INJECTION, SOLUTION INTRAMUSCULAR; INTRAVENOUS at 08:03

## 2020-03-03 RX ADMIN — DEXAMETHASONE SODIUM PHOSPHATE 4 MG: 4 INJECTION, SOLUTION INTRA-ARTICULAR; INTRALESIONAL; INTRAMUSCULAR; INTRAVENOUS; SOFT TISSUE at 07:03

## 2020-03-03 RX ADMIN — CHLORHEXIDINE GLUCONATE 0.12% ORAL RINSE 10 ML: 1.2 LIQUID ORAL at 06:03

## 2020-03-03 RX ADMIN — SODIUM CHLORIDE, SODIUM LACTATE, POTASSIUM CHLORIDE, AND CALCIUM CHLORIDE: 600; 310; 30; 20 INJECTION, SOLUTION INTRAVENOUS at 06:03

## 2020-03-03 RX ADMIN — ROCURONIUM BROMIDE 10 MG: 10 INJECTION, SOLUTION INTRAVENOUS at 07:03

## 2020-03-03 RX ADMIN — GLYCOPYRROLATE 0.4 MG: 0.2 INJECTION INTRAMUSCULAR; INTRAVENOUS at 09:03

## 2020-03-03 RX ADMIN — OXYCODONE HYDROCHLORIDE 5 MG: 5 TABLET ORAL at 10:03

## 2020-03-03 RX ADMIN — SUCCINYLCHOLINE CHLORIDE 140 MG: 20 INJECTION, SOLUTION INTRAMUSCULAR; INTRAVENOUS at 07:03

## 2020-03-03 RX ADMIN — SODIUM CHLORIDE, SODIUM LACTATE, POTASSIUM CHLORIDE, AND CALCIUM CHLORIDE: 600; 310; 30; 20 INJECTION, SOLUTION INTRAVENOUS at 08:03

## 2020-03-03 RX ADMIN — ROCURONIUM BROMIDE 35 MG: 10 INJECTION, SOLUTION INTRAVENOUS at 07:03

## 2020-03-03 RX ADMIN — Medication 80 MG: at 07:03

## 2020-03-03 RX ADMIN — NAPROXEN 500 MG: 250 TABLET ORAL at 06:03

## 2020-03-03 RX ADMIN — NEOSTIGMINE METHYLSULFATE 4 MG: 1 INJECTION INTRAVENOUS at 09:03

## 2020-03-03 NOTE — BRIEF OP NOTE
[facility]  Brief Operative Note    Surgery Date: 3/3/2020     Surgeon(s) and Role:     * Naun Fox MD - Primary    Assisting Surgeon: None    Pre-op Diagnosis:  Hypertrophy of breast [N62]  Davi's droop [N64.81]    Post-op Diagnosis:  Post-Op Diagnosis Codes:     * Hypertrophy of breast [N62]     * Davi's droop [N64.81]    Procedure(s) (LRB):  MAMMOPLASTY, REDUCTION (Bilateral)    Anesthesia: General    Description of the findings of the procedure(s): bilateral breast reduction with free nipple graft    Estimated Blood Loss: 100 cc         Specimens:     Right breast - 1216gm  Left breast - 1504 gm      Specimen (12h ago, onward)    None            Discharge Note    OUTCOME: Patient tolerate procedure well    DISPOSITION: Home    FINAL DIAGNOSIS:  Symptomatic macromastia    FOLLOWUP: in clinic    DISCHARGE INSTRUCTIONS:  Follow packet information from the office.

## 2020-03-03 NOTE — NURSING
"Patient AAOX3, informed patient that Dr Fox's nurse Lalita stated that she could come to the office to  another prescription for pain meds if she needed it. Provided the patient with the number to call the office to speak with Lalita. Patient stated "I will just use what I have and if I need it I will call and go over there". Significant other at bedside and understood. Patient is aware of measuring the drainage from left and right breast and keeping note of it. Patient left in significant others car with all belongings and discharge instructions.   "

## 2020-03-03 NOTE — H&P
[facility]  History & Physical  Plastic Surgery    SUBJECTIVE:     Chief Complaint/Reason for Admission: Symptomatic macromastia    History of Present Illness:  Patient is a 54 y.o. female presents with symptomatic macromastia. Desires breast reduction for relief of symptoms.    PTA Medications   Medication Sig    atorvastatin (LIPITOR) 80 MG tablet Take 1 tablet (80 mg total) by mouth once daily.    linaCLOtide (LINZESS) 290 mcg Cap capsule Take 1 capsule (290 mcg total) by mouth once daily.    omeprazole (PRILOSEC) 20 MG capsule Take 20 mg by mouth once daily.    tenofovir (VIREAD) 300 mg Tab Take 1 tablet (300 mg total) by mouth once daily.       Review of patient's allergies indicates:   Allergen Reactions    Corticosteroids (glucocorticoids) Palpitations    Morphine Anxiety       Past Medical History:   Diagnosis Date    Anxiety     Arthritis     Cocaine use     Depression     GERD (gastroesophageal reflux disease)     Hepatitis B     Hyperlipidemia     IBS (irritable bowel syndrome)     Liver fibrosis stage 2    Sleep apnea      Past Surgical History:   Procedure Laterality Date    CHOLECYSTECTOMY  2018    HYSTERECTOMY      LIVER BIOPSY  10/16/2014    Stage 2 fibrosis     Family History   Problem Relation Age of Onset    Hyperlipidemia Mother     Hypertension Mother     No Known Problems Father     Hyperlipidemia Sister     Hyperlipidemia Daughter     Diabetes Maternal Grandmother     Colon cancer Neg Hx      Social History     Tobacco Use    Smoking status: Light Tobacco Smoker     Packs/day: 0.50     Years: 45.00     Pack years: 22.50     Types: Cigarettes    Smokeless tobacco: Never Used   Substance Use Topics    Alcohol use: Yes     Alcohol/week: 0.0 standard drinks     Frequency: 2-4 times a month     Drinks per session: 1 or 2    Drug use: Not Currently     Types: Cocaine        Review of Systems:  Denies CP, SOB, N/V, H/A, or any other  complaints.    OBJECTIVE:     Vital Signs (Most Recent):  VSS AF    Physical Exam:  Gen - AO x 3, NAD  Neck - supple  Chest - non labored respirations  Ht - RRR  Breasts - large bilaterally, no masses, left 100 grams larger than right  Abd - soft, nt  Ext - no CCE    Laboratory:      Diagnostic Results:  EKG noted    ASSESSMENT/PLAN:     53 y/o with symptomatic macromastia  1. To OR for bilateral breast reduction with free nipple graft.

## 2020-03-03 NOTE — TELEPHONE ENCOUNTER
Patient has arrived at 0440. Told to arrive at 0530.  Reason for Disposition   Question about upcoming scheduled test, no triage required and triager able to answer question    Additional Information   Negative: [1] Caller is not with the adult (patient) AND [2] reporting urgent symptoms   Negative: Lab result questions   Negative: Medication questions   Negative: Caller can't be reached by phone   Negative: Caller has already spoken to PCP or another triager   Negative: RN needs further essential information from caller in order to complete triage   Negative: Requesting regular office appointment   Negative: [1] Caller requesting NON-URGENT health information AND [2] PCP's office is the best resource   Negative: Health Information question, no triage required and triager able to answer question   Negative: General information question, no triage required and triager able to answer question    Protocols used: INFORMATION ONLY CALL-A-

## 2020-03-03 NOTE — PLAN OF CARE
Patient prepared for surgery. Belongings locked in locker #2 per patient request. Pt states she does not want friend to come into pre-op area to wait with her; she does not want him to know any of her medical information but it is ok for him to receive texts for updating on surgical progress and it is ok for Dr. Fox to discuss surgery with him.

## 2020-03-03 NOTE — TRANSFER OF CARE
"Anesthesia Transfer of Care Note    Patient: Madison Hill    Procedure(s) Performed: Procedure(s) (LRB):  MAMMOPLASTY, REDUCTION (Bilateral)    Patient location: PACU    Anesthesia Type: general    Transport from OR: Transported from OR on room air with adequate spontaneous ventilation    Post pain: adequate analgesia    Post assessment: no apparent anesthetic complications    Post vital signs: stable    Level of consciousness: sedated    Nausea/Vomiting: no nausea/vomiting    Complications: none    Transfer of care protocol was followed      Last vitals:   Visit Vitals  /66 (BP Location: Right arm, Patient Position: Sitting)   Pulse 71   Temp 36.5 °C (97.7 °F) (Temporal)   Resp 18   Ht 5' 5" (1.651 m)   Wt 87.1 kg (191 lb 14.6 oz)   SpO2 97%   Breastfeeding? No   BMI 31.94 kg/m²     "

## 2020-03-03 NOTE — PLAN OF CARE
Reviewed and completed all discharge orders. Printed AVS and educated patient and friend, with patient's permission, of its entirety, including physician's orders, follow-up appt, medications, when to call, and when to report to the emergency room. Reviewed prescriptions, pharmacy information, and made sure there were no conflicts preventing the patient from obtaining the newly prescribed medications. I encouraged questions, answered them thoroughly, and evaluated my instructions via teach-back method. Patient has met all hospital discharge criteria at this point.

## 2020-03-03 NOTE — OR NURSING
Spoke with Dr. Gilbert regarding pt's SBP sustaining in the 170's, pt asymptomatic. No new orders. Will continue to monitor closely.

## 2020-03-03 NOTE — ANESTHESIA POSTPROCEDURE EVALUATION
Anesthesia Post Evaluation    Patient: Madison Hill    Procedure(s) Performed: Procedure(s) (LRB):  MAMMOPLASTY, REDUCTION (Bilateral)    Final Anesthesia Type: general    Patient location during evaluation: PACU  Patient participation: Yes- Able to Participate  Level of consciousness: awake and alert and oriented  Post-procedure vital signs: reviewed and stable  Pain management: adequate  Airway patency: patent    PONV status at discharge: No PONV  Anesthetic complications: no      Cardiovascular status: hemodynamically stable and hypertensive  Respiratory status: unassisted, spontaneous ventilation and room air  Hydration status: euvolemic  Follow-up not needed.          Vitals Value Taken Time   /84 3/3/2020 10:21 AM   Temp 36.2 °C (97.2 °F) 3/3/2020  9:31 AM   Pulse 83 3/3/2020 10:22 AM   Resp 17 3/3/2020 10:22 AM   SpO2 96 % 3/3/2020 10:22 AM   Vitals shown include unvalidated device data.      No case tracking events are documented in the log.      Pain/Hector Score: Pain Rating Prior to Med Admin: 8 (3/3/2020 10:09 AM)  Hector Score: 8 (3/3/2020 10:15 AM)

## 2020-03-03 NOTE — OP NOTE
[facility]  Surgery Department  Operative Note    SUMMARY     Date of Procedure: 3/3/2020     Procedure: Procedure(s) (LRB):  MAMMOPLASTY, REDUCTION (Bilateral)     Surgeon(s) and Role:     * Naun Fox MD - Primary    Assisting Surgeon: None    Pre-Operative Diagnosis: Hypertrophy of breast [N62]  Davi's droop [N64.81]    Post-Operative Diagnosis: Post-Op Diagnosis Codes:     * Hypertrophy of breast [N62]     * Davi's droop [N64.81]    Anesthesia: General    Technical Procedures Used: Bilateral breast reduction with free nipple graft  Right - 1216 grams  Left - 1504 grams    Description of the Findings of the Procedure:   presents interested in bilateral breast reduction for symptomatic macromastia.  Secondary to the large size of her breasts and the desired smaller size she will need a free nipple graft.  Risks, benefits, alternatives, possible complications were discussed with patient at length and she agreed to proceed with surgery.    Description of operative report.  Informed consent was obtained from the patient preoperative appointment.  The morning of surgery patient was seen examined and marked.  Operative plan was reconfirmed with the patient along with markings.  Antibiotics and SCDs were begun in holding.  Patient was then brought to the operating room and placed supine on the operative table.  General endotracheal anesthesia was administered without difficulty.  Patient's chest was prepped and draped in standard sterile fashion.  First local anesthetic was administered along Wise pattern skin markings on her breasts bilaterally.  Next a 42 mm cookie cutter was placed around the nipple and the nipples removed as a free nipple grafts and placed in cold saline soaked gauze.  First the larger left breast was addressed. 10 blade scalpel used to make an incision through the skin and Bovie electric cautery was used to remove the breast tissue from the central wedge and  inferiorly.  Meticulous hemostasis was achieved with electrocautery.  Breasts were irrigated with irrigation to confirm hemostasis.  Secondary to the large size of the resection, 15 [place]er drain was placed the breast incision place with a 2 0 silk.  Next 2 0 Vicryl was used to reapproximate Rena's inferiorly and the medial and lateral pillars centrally.  Combination of Instorb stapler and 3-0 Vicryl was used to reapproximate the deep dermis throughout.  4-0 Monocryl was used to close the skin throughout.    Next, the right side was addressed.10 blade scalpel used to make an incision through the skin and Bovie electric cautery was used to remove the breast tissue from the central wedge and inferiorly.  Meticulous hemostasis was achieved with electrocautery.  Breasts were irrigated with irrigation to confirm hemostasis.  Secondary to the large size of the resection, 15 [place] Islander drain was placed the breast incision place with a 2 0 silk.  Next 2 0 Vicryl was used to reapproximate Rena's inferiorly and the medial and lateral pillars centrally.  Combination of Instorb stapler and 3-0 Vicryl was used to reapproximate the deep dermis throughout.  4-0 Monocryl was used to close the skin throughout.    Next 38 mm cookie cutter was used at the predetermined nipple position.  The nipple was then defatted with scissors.  The nipple position was then de epithelialized.  The nipple was then sewn on as a free nipple graft and a bolster dressing was placed on top.  Sterile dressings were placed on top.  Patient tolerated procedure well was brought to recovery in excellent condition.    Significant Surgical Tasks Conducted by the Assistant(s), if Applicable: none    Complications: No    Estimated Blood Loss (EBL): 100cc           Implants:     Specimens:   Specimen (12h ago, onward)    None                  Condition: Good    Disposition: PACU    Attestation: Present and scrubbed throughtout entire procedure.

## 2020-03-03 NOTE — INTERVAL H&P NOTE
The patient has been examined and the H&P has been reviewed:    I spoke with patient and she stated that she does not have sleep apnea nor wears a CPAP mask; therefore there is no need for 23 hour observation. Otherwise, no changes. Proceed to surgery.    Anesthesia/Surgery risks, benefits and alternative options discussed and understood by patient/family.          Active Hospital Problems    Diagnosis  POA    Symptomatic mammary hypertrophy [N62]  Yes    Large breasts [N62]  Yes      Resolved Hospital Problems   No resolved problems to display.

## 2020-03-03 NOTE — PLAN OF CARE
Received patient from PACU nurse Padma; patient resting with eyes closed, NADN, easily aroused by verbal stimuli, friend at bedside. VS stable. Awaiting MD to see patient to give prescription. Call light within reach. Instructed friend to call if she needs anything, stated he understood and would call.

## 2020-03-04 ENCOUNTER — NURSE TRIAGE (OUTPATIENT)
Dept: ADMINISTRATIVE | Facility: CLINIC | Age: 55
End: 2020-03-04

## 2020-03-05 ENCOUNTER — HOSPITAL ENCOUNTER (EMERGENCY)
Facility: HOSPITAL | Age: 55
Discharge: HOME | End: 2020-03-05
Attending: FAMILY MEDICINE
Payer: MEDICAID

## 2020-03-05 VITALS
SYSTOLIC BLOOD PRESSURE: 141 MMHG | RESPIRATION RATE: 20 BRPM | DIASTOLIC BLOOD PRESSURE: 74 MMHG | HEIGHT: 65 IN | HEART RATE: 71 BPM | OXYGEN SATURATION: 98 % | TEMPERATURE: 98 F | BODY MASS INDEX: 31.82 KG/M2 | WEIGHT: 191 LBS

## 2020-03-05 DIAGNOSIS — G89.18 ACUTE POST-OPERATIVE PAIN: Primary | ICD-10-CM

## 2020-03-05 DIAGNOSIS — R42 DIZZINESS: ICD-10-CM

## 2020-03-05 LAB
ALBUMIN SERPL BCP-MCNC: 3.6 G/DL (ref 3.5–5.2)
ALP SERPL-CCNC: 95 U/L (ref 38–126)
ALT SERPL W/O P-5'-P-CCNC: 23 U/L (ref 10–44)
ANION GAP SERPL CALC-SCNC: 8 MMOL/L (ref 8–16)
AST SERPL-CCNC: 27 U/L (ref 15–46)
BASOPHILS # BLD AUTO: 0.02 K/UL (ref 0–0.2)
BASOPHILS NFR BLD: 0.3 % (ref 0–1.9)
BILIRUB SERPL-MCNC: 0.2 MG/DL (ref 0.1–1)
BUN SERPL-MCNC: 12 MG/DL (ref 7–17)
CALCIUM SERPL-MCNC: 8.7 MG/DL (ref 8.7–10.5)
CHLORIDE SERPL-SCNC: 103 MMOL/L (ref 95–110)
CO2 SERPL-SCNC: 28 MMOL/L (ref 23–29)
CREAT SERPL-MCNC: 0.56 MG/DL (ref 0.5–1.4)
DIFFERENTIAL METHOD: ABNORMAL
EOSINOPHIL # BLD AUTO: 0.1 K/UL (ref 0–0.5)
EOSINOPHIL NFR BLD: 1.1 % (ref 0–8)
ERYTHROCYTE [DISTWIDTH] IN BLOOD BY AUTOMATED COUNT: 15.9 % (ref 11.5–14.5)
EST. GFR  (AFRICAN AMERICAN): >60 ML/MIN/1.73 M^2
EST. GFR  (NON AFRICAN AMERICAN): >60 ML/MIN/1.73 M^2
GLUCOSE SERPL-MCNC: 158 MG/DL (ref 70–110)
HCT VFR BLD AUTO: 36.6 % (ref 37–48.5)
HGB BLD-MCNC: 11 G/DL (ref 12–16)
IMM GRANULOCYTES # BLD AUTO: 0.02 K/UL (ref 0–0.04)
IMM GRANULOCYTES NFR BLD AUTO: 0.3 % (ref 0–0.5)
LYMPHOCYTES # BLD AUTO: 2.8 K/UL (ref 1–4.8)
LYMPHOCYTES NFR BLD: 38.6 % (ref 18–48)
MCH RBC QN AUTO: 23.7 PG (ref 27–31)
MCHC RBC AUTO-ENTMCNC: 30.1 G/DL (ref 32–36)
MCV RBC AUTO: 79 FL (ref 82–98)
MONOCYTES # BLD AUTO: 0.5 K/UL (ref 0.3–1)
MONOCYTES NFR BLD: 6.4 % (ref 4–15)
NEUTROPHILS # BLD AUTO: 3.9 K/UL (ref 1.8–7.7)
NEUTROPHILS NFR BLD: 53.3 % (ref 38–73)
NRBC BLD-RTO: 0 /100 WBC
PLATELET # BLD AUTO: 178 K/UL (ref 150–350)
PMV BLD AUTO: 10.9 FL (ref 9.2–12.9)
POTASSIUM SERPL-SCNC: 3.7 MMOL/L (ref 3.5–5.1)
PROT SERPL-MCNC: 7 G/DL (ref 6–8.4)
RBC # BLD AUTO: 4.64 M/UL (ref 4–5.4)
SODIUM SERPL-SCNC: 139 MMOL/L (ref 136–145)
WBC # BLD AUTO: 7.34 K/UL (ref 3.9–12.7)

## 2020-03-05 PROCEDURE — 96374 THER/PROPH/DIAG INJ IV PUSH: CPT | Mod: ER

## 2020-03-05 PROCEDURE — 93005 ELECTROCARDIOGRAM TRACING: CPT | Mod: ER

## 2020-03-05 PROCEDURE — 99284 EMERGENCY DEPT VISIT MOD MDM: CPT | Mod: 25,ER

## 2020-03-05 PROCEDURE — 93010 ELECTROCARDIOGRAM REPORT: CPT | Mod: ,,, | Performed by: INTERNAL MEDICINE

## 2020-03-05 PROCEDURE — 80053 COMPREHEN METABOLIC PANEL: CPT | Mod: ER

## 2020-03-05 PROCEDURE — 93010 EKG 12-LEAD: ICD-10-PCS | Mod: ,,, | Performed by: INTERNAL MEDICINE

## 2020-03-05 PROCEDURE — 63600175 PHARM REV CODE 636 W HCPCS: Mod: ER | Performed by: FAMILY MEDICINE

## 2020-03-05 PROCEDURE — 96361 HYDRATE IV INFUSION ADD-ON: CPT | Mod: ER

## 2020-03-05 PROCEDURE — 85025 COMPLETE CBC W/AUTO DIFF WBC: CPT | Mod: ER

## 2020-03-05 RX ORDER — KETOROLAC TROMETHAMINE 30 MG/ML
30 INJECTION, SOLUTION INTRAMUSCULAR; INTRAVENOUS
Status: COMPLETED | OUTPATIENT
Start: 2020-03-05 | End: 2020-03-05

## 2020-03-05 RX ORDER — IBUPROFEN 800 MG/1
800 TABLET ORAL 3 TIMES DAILY PRN
Qty: 30 TABLET | Refills: 0 | Status: SHIPPED | OUTPATIENT
Start: 2020-03-05 | End: 2020-05-05

## 2020-03-05 RX ORDER — HYDROCODONE BITARTRATE AND ACETAMINOPHEN 5; 325 MG/1; MG/1
1 TABLET ORAL EVERY 6 HOURS PRN
Qty: 12 TABLET | Refills: 0 | Status: SHIPPED | OUTPATIENT
Start: 2020-03-05 | End: 2020-05-05

## 2020-03-05 RX ADMIN — KETOROLAC TROMETHAMINE 30 MG: 30 INJECTION, SOLUTION INTRAMUSCULAR at 02:03

## 2020-03-05 RX ADMIN — SODIUM CHLORIDE 1000 ML: 0.9 INJECTION, SOLUTION INTRAVENOUS at 01:03

## 2020-03-05 NOTE — TELEPHONE ENCOUNTER
Spoke with patient she reports that she had breast reduction surgery on yesterday and her drainage tubes in both breast are not putting out much drainage since this morning.  She stated that the most drainage she has had out all day has been 10ml out of both drains.  Patient reports drainage being light burgundy in color.  Spoke with Plastic on call Singh Veliz he stated that patient can pinch tubing at the top and pull down using fingers to milk the tubing while keeping tubing stabilized.  Attempted to give patient the instructions and she stated that she did not want to mess with the tubing.  Advised patient that per Singh she can call tomorrow to be seen in the clinic.  Patient stated that that was to far away.  Advised patient to go to ER if she starts to have increased breast pain, signs of infection, skin breakdown, or significant pain.  Patient verbalized understanding.  All care instructions given by Singh Veliz.      Reason for Disposition   General information question, no triage required and triager able to answer question    Protocols used: INFORMATION ONLY CALL-A-

## 2020-03-05 NOTE — ED NOTES
pt had breast reduction 2 days ago. incision to bilat breast around areola and under breasts are intact and healing well. minimal bloody drainage noted. dressing to left breast was changed. skin cleansed with saline, xeroform gauze, and tegaderm placed. No redness or swelling noted.
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
## 2020-03-09 VITALS
HEIGHT: 65 IN | WEIGHT: 191.94 LBS | TEMPERATURE: 98 F | BODY MASS INDEX: 31.98 KG/M2 | RESPIRATION RATE: 16 BRPM | HEART RATE: 83 BPM | DIASTOLIC BLOOD PRESSURE: 76 MMHG | OXYGEN SATURATION: 97 % | SYSTOLIC BLOOD PRESSURE: 140 MMHG

## 2020-03-12 ENCOUNTER — TELEPHONE (OUTPATIENT)
Dept: HEPATOLOGY | Facility: CLINIC | Age: 55
End: 2020-03-12

## 2020-03-12 NOTE — TELEPHONE ENCOUNTER
Contacted patient to confirm appointment on tomorrow but patient canceled due to the lack of transportation. Patient stated she will call back to reschedule. Appointment canceled.

## 2020-03-16 LAB — FINAL PATHOLOGIC DIAGNOSIS: NORMAL

## 2020-05-01 ENCOUNTER — TELEPHONE (OUTPATIENT)
Dept: HEPATOLOGY | Facility: CLINIC | Age: 55
End: 2020-05-01

## 2020-05-01 NOTE — TELEPHONE ENCOUNTER
----- Message from Doris Celeste MA sent at 5/1/2020  1:03 PM CDT -----  Contact: Patient @ [phone]      ----- Message -----  From: Alfa Maynard  Sent: 5/1/2020  12:08 PM CDT  To: Clint Renteria Staff    Patient calling to get the status on if the called was made from Dr Jaramillo office regarding the medicaid transportation to the 5-25th appt, pls advise or if not pls call  Medicaid transportation @ [phone] to confirm her 5-25th appt with [facility]

## 2020-05-01 NOTE — TELEPHONE ENCOUNTER
"MA called patient inform her that we don't call medicaid for transportation she have to call them to arrange this. Patient start yelling on the phone stated "NO! Medicaid told me that you guys suppose to call them because they are not picking up any patients right now!"  Patient also stated that the hospital suppose to be calling medicaid for urgent appt. Explained to patient that her appointment is not considered urgent and we are not sure if we are going to start seeing patients in our clinic yet.     Explained to patient that we go by week to see what are we going to do. We might end up rescheduling her appt. Patient was so rude, I was talking and explaining and she was talking also and saying " WHATEVER!" and hang up the call.   "

## 2020-05-19 ENCOUNTER — TELEPHONE (OUTPATIENT)
Dept: HEPATOLOGY | Facility: CLINIC | Age: 55
End: 2020-05-19

## 2020-05-19 NOTE — TELEPHONE ENCOUNTER
----- Message from Vera Argueta sent at 5/19/2020 10:01 AM CDT -----  Pt called want to know if she can reschedule her appt.  She stated she really need to see the doctor.

## 2020-05-19 NOTE — TELEPHONE ENCOUNTER
MA called patient back reschedule her appt she accepted 6/4 with NP. Mailed appt reminder to patient.

## 2020-06-04 ENCOUNTER — PROCEDURE VISIT (OUTPATIENT)
Dept: HEPATOLOGY | Facility: CLINIC | Age: 55
End: 2020-06-04
Payer: MEDICAID

## 2020-06-04 ENCOUNTER — OFFICE VISIT (OUTPATIENT)
Dept: HEPATOLOGY | Facility: CLINIC | Age: 55
End: 2020-06-04
Payer: MEDICAID

## 2020-06-04 ENCOUNTER — LAB VISIT (OUTPATIENT)
Dept: LAB | Facility: HOSPITAL | Age: 55
End: 2020-06-04
Payer: MEDICAID

## 2020-06-04 VITALS
OXYGEN SATURATION: 98 % | SYSTOLIC BLOOD PRESSURE: 154 MMHG | DIASTOLIC BLOOD PRESSURE: 72 MMHG | BODY MASS INDEX: 31.74 KG/M2 | WEIGHT: 190.5 LBS | HEIGHT: 65 IN | HEART RATE: 79 BPM

## 2020-06-04 DIAGNOSIS — B18.1 CHRONIC HEPATITIS B: Primary | Chronic | ICD-10-CM

## 2020-06-04 DIAGNOSIS — B18.1 CHRONIC HEPATITIS B: Chronic | ICD-10-CM

## 2020-06-04 LAB
AFP SERPL-MCNC: 6.3 NG/ML (ref 0–8.4)
ALBUMIN SERPL BCP-MCNC: 3.7 G/DL (ref 3.5–5.2)
ALP SERPL-CCNC: 101 U/L (ref 55–135)
ALT SERPL W/O P-5'-P-CCNC: 27 U/L (ref 10–44)
ANION GAP SERPL CALC-SCNC: 6 MMOL/L (ref 8–16)
AST SERPL-CCNC: 25 U/L (ref 10–40)
BILIRUB SERPL-MCNC: 0.3 MG/DL (ref 0.1–1)
BUN SERPL-MCNC: 12 MG/DL (ref 6–20)
CALCIUM SERPL-MCNC: 9.7 MG/DL (ref 8.7–10.5)
CHLORIDE SERPL-SCNC: 106 MMOL/L (ref 95–110)
CO2 SERPL-SCNC: 28 MMOL/L (ref 23–29)
CREAT SERPL-MCNC: 0.8 MG/DL (ref 0.5–1.4)
EST. GFR  (AFRICAN AMERICAN): >60 ML/MIN/1.73 M^2
EST. GFR  (NON AFRICAN AMERICAN): >60 ML/MIN/1.73 M^2
GLUCOSE SERPL-MCNC: 101 MG/DL (ref 70–110)
INR PPP: 1 (ref 0.8–1.2)
POTASSIUM SERPL-SCNC: 4 MMOL/L (ref 3.5–5.1)
PROT SERPL-MCNC: 8.1 G/DL (ref 6–8.4)
PROTHROMBIN TIME: 10.7 SEC (ref 9–12.5)
SODIUM SERPL-SCNC: 140 MMOL/L (ref 136–145)

## 2020-06-04 PROCEDURE — 99214 OFFICE O/P EST MOD 30 MIN: CPT | Mod: PBBFAC,25 | Performed by: NURSE PRACTITIONER

## 2020-06-04 PROCEDURE — 80053 COMPREHEN METABOLIC PANEL: CPT

## 2020-06-04 PROCEDURE — 86707 HEPATITIS BE ANTIBODY: CPT

## 2020-06-04 PROCEDURE — 99999 PR PBB SHADOW E&M-EST. PATIENT-LVL IV: ICD-10-PCS | Mod: PBBFAC,,, | Performed by: NURSE PRACTITIONER

## 2020-06-04 PROCEDURE — 91200 LIVER ELASTOGRAPHY: CPT | Mod: 26,S$PBB,, | Performed by: PHYSICIAN ASSISTANT

## 2020-06-04 PROCEDURE — 87350 HEPATITIS BE AG IA: CPT

## 2020-06-04 PROCEDURE — 99999 PR PBB SHADOW E&M-EST. PATIENT-LVL IV: CPT | Mod: PBBFAC,,, | Performed by: NURSE PRACTITIONER

## 2020-06-04 PROCEDURE — 99204 PR OFFICE/OUTPT VISIT, NEW, LEVL IV, 45-59 MIN: ICD-10-PCS | Mod: S$PBB,,, | Performed by: NURSE PRACTITIONER

## 2020-06-04 PROCEDURE — 86706 HEP B SURFACE ANTIBODY: CPT

## 2020-06-04 PROCEDURE — 82105 ALPHA-FETOPROTEIN SERUM: CPT

## 2020-06-04 PROCEDURE — 86803 HEPATITIS C AB TEST: CPT

## 2020-06-04 PROCEDURE — 87340 HEPATITIS B SURFACE AG IA: CPT

## 2020-06-04 PROCEDURE — 86790 VIRUS ANTIBODY NOS: CPT

## 2020-06-04 PROCEDURE — 91200 LIVER ELASTOGRAPHY: CPT | Mod: PBBFAC | Performed by: PHYSICIAN ASSISTANT

## 2020-06-04 PROCEDURE — 99204 OFFICE O/P NEW MOD 45 MIN: CPT | Mod: S$PBB,,, | Performed by: NURSE PRACTITIONER

## 2020-06-04 PROCEDURE — 91200 FIBROSCAN (VIBRATION CONTROLLED TRANSIENT ELASTOGRAPHY): ICD-10-PCS | Mod: 26,S$PBB,, | Performed by: PHYSICIAN ASSISTANT

## 2020-06-04 PROCEDURE — 87517 HEPATITIS B DNA QUANT: CPT

## 2020-06-04 PROCEDURE — 85610 PROTHROMBIN TIME: CPT

## 2020-06-04 NOTE — PROCEDURES
FibroScan (Vibration Controlled Transient Elastography)  Date/Time: 6/4/2020 2:00 PM  Performed by: Denise Samuel PA-C  Authorized by: Denise Samuel PA-C     Diagnosis:  HBV    Probe:     Universal Protocol: Patient's identity, procedure and site were verified, confirmatory pause was performed. Discussed procedure including risks and potential complications.  Questions answered.  Patient verbalizes understanding and wishes to proceed with VCTE.     Procedure: After providing explanations of the procedure, patient was placed in the supine position with right arm in maximum abduction to allow optimal exposure of right lateral abdomen.  Patient was briefly assessed, Testing was performed in the mid-axillary location, 50Hz Shear Wave pulses were applied and the resulting Shear Wave and Propagation Speed detected with a 3.5 MHz ultrasonic signal, using the FibroScan probe, Skin to liver capsule distance and liver parenchyma were accessed during the entire examination with the FibroScan probe, Patient was instructed to breathe normally and to abstain from sudden movements during the procedure, allowing for random measurements of liver stiffness. At least 10 Shear Waves were produced, Individual measurements of each Shear Wave were calculated.  Patient tolerated the procedure well with no complications.  Meets discharge criteria as was dismissed.  Rates pain 0 out of 10.  Patient will follow up with ordering provider to review results.      Findings  Median liver stiffness score:  5.6  CAP Reading: dB/m:  255    IQR/med %:  21  Interpretation  Fibrosis interpretation is based on medial liver stiffness - Kilopascal (kPa).    Fibrosis Stage:  F 0-1  Steatosis interpretation is based on controlled attenuation parameter - (dB/m).    Steatosis Grade:  S1

## 2020-06-04 NOTE — LETTER
June 4, 2020      Susi Chambers MD  [address]  [address]  [place] [address]           [facility] - Hepatology  [address]  [place] [address]  Phone: [phone]  Fax: [phone]          Patient: Madison Hill   MR Number: [id number]   YOB: 1965   Date of Visit: 6/4/2020       Dear Dr. Susi Chambers:    Thank you for referring Madison Hill to me for evaluation. Attached you will find relevant portions of my assessment and plan of care.    If you have questions, please do not hesitate to call me. I look forward to following Madison Hill along with you.    Sincerely,    Kami Fink, NP    Enclosure  CC:  No Recipients    If you would like to receive this communication electronically, please contact [email or web address] or [phone] to request more information on [email or web address] Link access.    For providers and/or their staff who would like to refer a patient to [facility], please contact us through our one-stop-shop provider referral line, [facility], at [phone].    If you feel you have received this communication in error or would no longer like to receive these types of communications, please e-mail [email or web address]

## 2020-06-04 NOTE — PROGRESS NOTES
[facility] - New Patient     REFERRING PROVIDER: Dr. Susi Chambers    CHIEF COMPLAINT: Chronic hepatitis B     HPI: This is a 54 y.o. Black or  female referred for evaluation of chronic hepatitis B diagnosed in 2014. Per chart review, etiology unknown though patient has past drug use history as a risk factor.    Liver enzymes have been stable with small elevations in ALT intermittently, the patient has been placed on Viread via infectious disease physician Alycia (9/2019) after patient had detectable DNA, eAg(-).    Patient previously followed by and biopsied by LANDRY Elder in 2014/2015 w/ concern for long-term antiviral medication management as patient has demonstrated history of noncompliance at the time (concern for skipped/missed doses if started).     She is overdue for HCC screening with AFP and ultrasound.     Liver biopsy 10/16/14:  Liver, CT-guided needle biopsy:  Chronic hepatitis with portal inflammation and mild lobular activity.  No interface activity is identified.  Periportal fibrosis with thin portal to portal septa (stage 2 of 4 fibrosis) is present.     Last hep labs 8/28/2019:   HBV Quant: 34,200  HBSag: reactive  HBEag: negative     Lab Results   Component Value Date    ALT 23 03/05/2020    AST 27 03/05/2020    ALKPHOS 95 03/05/2020    BILITOT 0.2 03/05/2020     Hepatitis immunization status:    Hep A: unknown    Hep B: exposed    Interval history:   The patient states she is doing well without any issues. She has been taking the Viread every morning for 2-3 months, and states she has not used drugs or alcohol for years. Denies symptoms of hepatic decompensation including jaundice, ascites, cognitive problems to suggest hepatic encephalopathy, or GI bleeding.      Asked about her reported pain symptom on nurse triage, she reports chronic pain issues including back pain, chronic constipation and RUQ abdominal pain x 3 years. She has never seen someone for it  "specifically but then states she has tried many medications she cannot recall for the issue that "do not work." Denies used of NSAIDs or tylenol. States she will follow up with her PCP for this.     Review of patient's allergies indicates:   Allergen Reactions    Corticosteroids (glucocorticoids) Palpitations    Morphine Anxiety        Current Outpatient Medications on File Prior to Visit   Medication Sig Dispense Refill    linaCLOtide (LINZESS) 290 mcg Cap capsule Take 1 capsule (290 mcg total) by mouth once daily. 90 capsule 3    omeprazole (PRILOSEC) 20 MG capsule Take 20 mg by mouth once daily.      tenofovir (VIREAD) 300 mg Tab Take 1 tablet (300 mg total) by mouth once daily. 30 tablet 11    atorvastatin (LIPITOR) 80 MG tablet Take 1 tablet (80 mg total) by mouth once daily. 90 tablet 3    [DISCONTINUED] DULoxetine (CYMBALTA) 30 MG capsule Take 1 tab po in AM for 2 weeks and then 2 tabs in AM daily after that. (Patient not taking: Reported on 6/4/2020) 60 capsule 5    [DISCONTINUED] methylPREDNISolone (MEDROL DOSEPACK) 4 mg tablet use as directed (Patient not taking: Reported on 6/4/2020) 1 Package 0     No current facility-administered medications on file prior to visit.          PMHX:  has a past medical history of Anxiety, Arthritis, Cocaine use, Depression, GERD (gastroesophageal reflux disease), Hepatitis B, Hyperlipidemia, IBS (irritable bowel syndrome), Liver fibrosis (stage 2), and Sleep apnea.    PSHX:  has a past surgical history that includes Hysterectomy; Liver biopsy (10/16/2014); Cholecystectomy (2018); and Total Reduction Mammoplasty (Bilateral, 3/3/2020).    FAMILY HISTORY:   Family History   Problem Relation Age of Onset    Hyperlipidemia Mother     Hypertension Mother     No Known Problems Father     Hyperlipidemia Sister     Hyperlipidemia Daughter     Diabetes Maternal Grandmother     Colon cancer Neg Hx     Cirrhosis Neg Hx        SOCIAL HISTORY:   Social History "     Tobacco Use   Smoking Status Light Tobacco Smoker    Packs/day: 0.50    Years: 45.00    Pack years: 22.50    Types: Cigarettes   Smokeless Tobacco Never Used       Social History     Substance and Sexual Activity   Alcohol Use Not Currently    Alcohol/week: 0.0 standard drinks    Frequency: 2-4 times a month    Drinks per session: 1 or 2    Comment: 2 months ago        Social History     Substance and Sexual Activity   Drug Use Not Currently    Types: Cocaine    Comment: three years        Review of Systems   Constitutional: Negative for appetite change, chills, fatigue, fever and unexpected weight change.   Eyes: Negative for visual disturbance.   Respiratory: Negative for cough and shortness of breath.    Cardiovascular: Negative for chest pain, palpitations and leg swelling.   Gastrointestinal: Negative for abdominal distention, abdominal pain, blood in stool, constipation, diarrhea, nausea and vomiting. No change in stool color.  Genitourinary: Negative for dysuria and hematuria. Denies dark urine.   Musculoskeletal: Negative for arthralgias, gait problem, joint swelling and myalgias.   Skin: Negative for color change, rash and wound. Denies itching.   Neurological: Negative for dizziness, tremors, speech difficulty, and headaches.   Hematological: Does not bruise/bleed easily.   Psychiatric/Behavioral: Negative for confusion, decreased concentration and sleep disturbance. Denies memory loss. Denies depression. The patient is not nervous/anxious.        DATA     Physical Exam   Constitutional: Well-nourished. No distress. Alert and oriented to person, place, and time.  Eyes: No scleral icterus.   Cardiovascular: Normal rate, regular rhythm and normal heart sounds. No murmur heard.  Pulmonary/Chest: Respiratory effort and breath sounds normal. No respiratory distress.   Abdominal: Obese abdomen. Soft, non-tender, no pain elicited on palpation of RUQ. Bowel sounds are normal. No distension; no ascites  "appreciated. There is no palpable hepatosplenomegaly. No hernia or mass.   Musculoskeletal: No edema.   Neurological: No tremor. Coordination and gait normal. No asterixis.    Skin: Skin is warm and dry. No rash or erythema. No jaundice. No telangiectasias or palmar erythema noted.  Psychiatric: Normal mood and affect. Speech, behavior, and thought content normal. No depression or anxiety noted.     BP (!) 154/72 (BP Location: Left arm, Patient Position: Sitting, BP Method: Medium (Automatic))   Pulse 79   Ht 5' 5" (1.651 m)   Wt 86.4 kg (190 lb 7.6 oz)   SpO2 98%   BMI 31.70 kg/m²     LABS:  Lab Results   Component Value Date    WBC 7.34 03/05/2020    HGB 11.0 (L) 03/05/2020    HCT 36.6 (L) 03/05/2020     03/05/2020     03/05/2020    K 3.7 03/05/2020    CREATININE 0.56 03/05/2020    ALT 23 03/05/2020    AST 27 03/05/2020    ALKPHOS 95 03/05/2020    BILITOT 0.2 03/05/2020    ALBUMIN 3.6 03/05/2020    INR 1.0 02/11/2020    AFP 5.9 10/07/2014    CHOL 258 (H) 07/15/2019    TRIG 75 07/15/2019    LDLCALC 189.0 (H) 07/15/2019    HGBA1C 5.8 (H) 10/09/2018       No results found for: HEPAIGG    Hepatitis B Surface Ag   Date Value Ref Range Status   08/19/2019 REACTIVE (A) NONREACTIVE Final     Comment:     SCREENING TESTS THAT ARE REACTIVE ARE SENT OUT TO A REFERENCE LAB FOR  CONFIRMATION. PLEASE DO NOT INTERPRET AS REACTIVE UNTIL CONFIRMATION  TESTING IS COMPLETE.       No results found for: HEPBCAB  Hep B S Ab   Date Value Ref Range Status   10/07/2016 Negative  Final     Hepatitis C Ab   Date Value Ref Range Status   10/10/2018 Negative  Final       There is no immunization history on file for this patient.       DIAGNOSTIC STUDIES:  ABD. U/S -   Results for orders placed during the hospital encounter of 10/31/16   US Abdomen Limited    Narrative Examination: Limited abdominal sonogram     Clinical history: Hepatitis B    Comparison: Comparison is made with the patient's previous ultrasound examination " of 05/20/2014 as well as the more recent CT scan of the abdomen 10/16/2014.    Findings: Standard sonographic evaluation of the patient's right upper quadrant region was completed utilizing grayscale color flow imaging.  Gallbladder is contracted limiting assessment given the marked contractility within the gallbladder lumen without evidence of any significant internal stones nor per wall thickening.  Common duct diameter normal at 2.7 mm in widest dimension.  The pancreas appears normal in overall size and echotexture without evidence of obstruction dilatation.  Liver size and echotexture appear normal measuring 13.2 cm long axis.  The right kidney measures 10.1 cm in length times the normal cortical thickness and without evidence of hydronephrosis..    Impression  Contracted gallbladder without evidence of focality.  No sonographic Cadet sign was reported to suggest presence of acute gallbladder inflammation.      Electronically signed by: FORREST RITTER MD  Date:     10/31/16  Time:    15:25      Liver biopsy 10/16/14:  Liver, CT-guided needle biopsy:  Chronic hepatitis with portal inflammation and mild lobular activity.  No interface activity is identified.  Periportal fibrosis with thin portal to portal septa (stage 2 of 4 fibrosis) is present.     Fibroscan 6/4/2020:   Findings  Median liver stiffness score:  5.6  CAP Reading: dB/m:  255     IQR/med %:  21  Interpretation  Fibrosis interpretation is based on medial liver stiffness - Kilopascal (kPa).     Fibrosis Stage:  F 0-1  Steatosis interpretation is based on controlled attenuation parameter - (dB/m).     Steatosis Grade:  S1      EDUCATION / DISCUSSION:   -- NO alcohol (no wine, beer or liquor) for life. Avoid raw seafood.   -- Discussed transmission of HBV, including sexual transmission. Avoid sharing razors/toothbrushes, etc.   -- Discussed importance of Hep B screening for all current and previous sexual partners, as well as complete Hep B  vaccination for all current sexual partners. For sexual partners who have not completed the Hep B vaccine series, barrier sexual protection is recommended to prevent spread of the virus.  -- Natural history of HBV including progression to cirrhosis reviewed.   -- We discussed the increased risk of hepatocellular carcinoma due to active hepatitis B infection. Continued screening every six months with ultrasound and AFP is recommended.   -- Discussed potential outcomes of cirrhosis, including liver cancer, liver failure, liver transplant, death.   -- Limit acetaminophen to 2000mg daily.  -- Advised immunization of all household members.  -- Discussed importance of compliance with medication regimen and risk of viral mutation if treatment is stopped prematurely.       ASSESSMENT & PLAN     54 y.o. Black or  female with:    1. Chronic hepatitis B, E Ag negative, E Ab unknown, diagnosed 11/2014    -- Treatment: Pt. Reportedly started Viread two months ago, counseled to diligently continue Viread 300mg PO QD, without missing any doses    -- Will investigate immunity to Hep A, prior Hep C exposure, & baseline INR today  -- Will obtain overdue HCC screening Q 6 months with CMP, Hep B DNA, Hep B S Ag, Hep B S Ab, AFP, and abd U/S; next due December 2020.     -- Fibroscan F0-F1 today, explained to patient that adherence to Viread daily and abstinence from alcohol is imperative   -- Counseled that her live-in boyfriend needs to be tested for Hep B and can be immunized if he is not protected  -- Screening for Hep C and HIV negative in 2019   -- Hep B counseling noted above discussed. Sexual partners and family members in household need to be tested and vaccinated if negative. Must use protection for sex (Hep B)  -- The patient prefers phone calls for her results.   -- Follow up with PCP for chronic back and abdominal pain    Orders Placed This Encounter   Procedures    FibroScan (Vibration Controlled Transient  Elastography)    US Abdomen Complete    AFP tumor marker    Hepatitis B e antigen    Hepatitis B e antibody    Hepatitis B Surface Ab, Qualitative    Hepatitis B Surface Antigen    Hepatitis A antibody, IgG    Protime-INR    HEPATITIS B VIRAL DNA, QUANTITATIVE    Hepatitis C Antibody    Comprehensive metabolic panel     Return to clinic pending above results (may be able to do labs/US in 6 months without visit depending on imaging/labs)    Thank you for allowing me to participate in the care of [place]      Patient seen with Denise Samuel PA-C   Hepatology

## 2020-06-04 NOTE — PATIENT INSTRUCTIONS
-- Avoid alcohol. Avoid raw seafood.   -- Hepatitis B is spread through blood and bodily fluids. Do not share  razors/toothbrushes, etc, with others   -- it is possible that Hepatitis B can cause scar tissue in the liver, which can progress to cirrhosis.   -- Hepatitis B, in some cases, has a higher risk of liver cancer (hepatocellular carcinoma), especially with active hepatitis B infection. We will perform liver cancer screening every six months with ultrasound and AFP along with a clinic visit every 6 months  -- If you develop cirrhosis, it is important that we monitor you closely, as cirrhosis can cause liver cancer, liver failure, liver transplant, death.   -- Limit acetaminophen to 2000mg daily.  -- Recommended that all 1st degree relatives, household members and sexual contacts are screened for Hepatitis B. If they are negative for Hepatitis B, they should be vaccinated against Hepatitis B to protect themselves from joe the virus  -- It is important for all current and previous sexual partners to be tested for Hepatitis B as well as complete Hep B vaccination. For sexual partners who have not completed the Hep B vaccine series, barrier sexual protection is recommended to prevent spread of the virus.  -- It is important that you take your Hepatitis B medication as prescribed without any missed doses, as missing doses or stopping the medication can cause the Hepatitis B virus to change and make it difficult to treat

## 2020-06-05 LAB
HBV SURFACE AB SER-ACNC: NEGATIVE M[IU]/ML
HBV SURFACE AG SERPL QL IA: POSITIVE
HCV AB SERPL QL IA: NEGATIVE
HEPATITIS A ANTIBODY, IGG: POSITIVE

## 2020-06-09 LAB
HBV E AB SER QL: ABNORMAL
HBV E AG SERPL QL IA: NORMAL

## 2020-06-10 ENCOUNTER — HOSPITAL ENCOUNTER (OUTPATIENT)
Dept: RADIOLOGY | Facility: HOSPITAL | Age: 55
Discharge: HOME | End: 2020-06-10
Attending: PHYSICIAN ASSISTANT
Payer: MEDICAID

## 2020-06-10 DIAGNOSIS — B18.1 CHRONIC HEPATITIS B: ICD-10-CM

## 2020-06-10 LAB
HBV DNA SERPL NAA+PROBE-ACNC: <10 IU/ML
HBV DNA SERPL NAA+PROBE-LOG IU: <1 LOG (10) IU/ML
HBV DNA SERPL QL NAA+PROBE: NOT DETECTED

## 2020-06-10 PROCEDURE — 76700 US EXAM ABDOM COMPLETE: CPT | Mod: 26,,, | Performed by: RADIOLOGY

## 2020-06-10 PROCEDURE — 76700 US EXAM ABDOM COMPLETE: CPT | Mod: TC

## 2020-06-10 PROCEDURE — 76700 US ABDOMEN COMPLETE: ICD-10-PCS | Mod: 26,,, | Performed by: RADIOLOGY

## 2020-06-11 ENCOUNTER — TELEPHONE (OUTPATIENT)
Dept: HEPATOLOGY | Facility: CLINIC | Age: 55
End: 2020-06-11

## 2020-06-11 ENCOUNTER — TELEPHONE (OUTPATIENT)
Dept: PHARMACY | Facility: CLINIC | Age: 55
End: 2020-06-11

## 2020-06-11 DIAGNOSIS — B18.1 CHRONIC HEPATITIS B: Primary | ICD-10-CM

## 2020-06-11 RX ORDER — TENOFOVIR DISOPROXIL FUMARATE 300 MG/1
300 TABLET, FILM COATED ORAL DAILY
Qty: 30 TABLET | Refills: 11 | Status: SHIPPED | OUTPATIENT
Start: 2020-06-11 | End: 2020-12-29 | Stop reason: SDUPTHER

## 2020-06-11 NOTE — TELEPHONE ENCOUNTER
Informed patient that [facility] received prescription for Viread Patient picked up medication from  [facility]/pharmacy #[id number]  on 06/10 and will continue to fill there

## 2020-06-15 ENCOUNTER — TELEPHONE (OUTPATIENT)
Dept: HEPATOLOGY | Facility: CLINIC | Age: 55
End: 2020-06-15

## 2020-06-15 NOTE — TELEPHONE ENCOUNTER
Spoke with patient to discuss results, stable U/S and liver labs. Will f/u in 6 months for continued HCC screening. Pt. to continue Viread daily, refilled.

## 2020-08-07 NOTE — TELEPHONE ENCOUNTER
----- Message from Dell Miller sent at 1/21/2020  9:41 AM CST -----  Contact: Patient  Patient called in and wanted to speak with someone at the office regarding an appointment and would like a call back from the office. The patient can be reached at    [phone]  
Tried to reach patient. No answer and no voice mail.  
Universal Safety Interventions

## 2020-09-09 ENCOUNTER — OFFICE VISIT (OUTPATIENT)
Dept: URGENT CARE | Facility: CLINIC | Age: 55
End: 2020-09-09
Payer: MEDICAID

## 2020-09-09 VITALS
HEART RATE: 92 BPM | DIASTOLIC BLOOD PRESSURE: 88 MMHG | BODY MASS INDEX: 31.65 KG/M2 | HEIGHT: 65 IN | SYSTOLIC BLOOD PRESSURE: 160 MMHG | TEMPERATURE: 97 F | WEIGHT: 190 LBS | OXYGEN SATURATION: 97 % | RESPIRATION RATE: 16 BRPM

## 2020-09-09 DIAGNOSIS — N76.0 BV (BACTERIAL VAGINOSIS): ICD-10-CM

## 2020-09-09 DIAGNOSIS — N89.8 VAGINAL ODOR: ICD-10-CM

## 2020-09-09 DIAGNOSIS — B96.89 BV (BACTERIAL VAGINOSIS): ICD-10-CM

## 2020-09-09 DIAGNOSIS — N94.9 VAGINAL BURNING: Primary | ICD-10-CM

## 2020-09-09 LAB
BILIRUB UR QL STRIP: NEGATIVE
GLUCOSE UR QL STRIP: NEGATIVE
KETONES UR QL STRIP: NEGATIVE
LEUKOCYTE ESTERASE UR QL STRIP: NEGATIVE
PH, POC UA: 5.5 (ref 5–8)
POC BLOOD, URINE: NEGATIVE
POC NITRATES, URINE: NEGATIVE
PROT UR QL STRIP: NEGATIVE
SP GR UR STRIP: 1.02 (ref 1–1.03)
UROBILINOGEN UR STRIP-ACNC: NORMAL (ref 0.1–1.1)

## 2020-09-09 PROCEDURE — 87481 CANDIDA DNA AMP PROBE: CPT | Mod: 59

## 2020-09-09 PROCEDURE — 87661 TRICHOMONAS VAGINALIS AMPLIF: CPT

## 2020-09-09 PROCEDURE — 99214 PR OFFICE/OUTPT VISIT, EST, LEVL IV, 30-39 MIN: ICD-10-PCS | Mod: 25,S$GLB,, | Performed by: PHYSICIAN ASSISTANT

## 2020-09-09 PROCEDURE — 87801 DETECT AGNT MULT DNA AMPLI: CPT

## 2020-09-09 PROCEDURE — 81003 URINALYSIS AUTO W/O SCOPE: CPT | Mod: QW,S$GLB,, | Performed by: PHYSICIAN ASSISTANT

## 2020-09-09 PROCEDURE — 99214 OFFICE O/P EST MOD 30 MIN: CPT | Mod: 25,S$GLB,, | Performed by: PHYSICIAN ASSISTANT

## 2020-09-09 PROCEDURE — 81003 POCT URINALYSIS, DIPSTICK, AUTOMATED, W/O SCOPE: ICD-10-PCS | Mod: QW,S$GLB,, | Performed by: PHYSICIAN ASSISTANT

## 2020-09-09 PROCEDURE — 87491 CHLMYD TRACH DNA AMP PROBE: CPT | Mod: 59

## 2020-09-09 RX ORDER — CEFTRIAXONE 1 G/1
250 INJECTION, POWDER, FOR SOLUTION INTRAMUSCULAR; INTRAVENOUS
Status: COMPLETED | OUTPATIENT
Start: 2020-09-09 | End: 2020-09-09

## 2020-09-09 RX ORDER — MONTELUKAST SODIUM 10 MG/1
10 TABLET ORAL DAILY
COMMUNITY
Start: 2020-08-15

## 2020-09-09 RX ORDER — ACETAMINOPHEN AND CODEINE PHOSPHATE 300; 60 MG/1; MG/1
TABLET ORAL
COMMUNITY
Start: 2020-08-16

## 2020-09-09 RX ORDER — PANTOPRAZOLE SODIUM 40 MG/1
40 TABLET, DELAYED RELEASE ORAL DAILY
COMMUNITY
Start: 2020-07-27

## 2020-09-09 RX ORDER — ROSUVASTATIN CALCIUM 40 MG/1
40 TABLET, COATED ORAL DAILY
COMMUNITY
Start: 2020-08-15

## 2020-09-09 RX ORDER — CYCLOBENZAPRINE HCL 10 MG
TABLET ORAL
COMMUNITY
Start: 2020-08-16 | End: 2020-12-29

## 2020-09-09 RX ORDER — METRONIDAZOLE 500 MG/1
500 TABLET ORAL 3 TIMES DAILY
Qty: 21 TABLET | Refills: 0 | Status: SHIPPED | OUTPATIENT
Start: 2020-09-09 | End: 2020-09-16

## 2020-09-09 RX ORDER — AZITHROMYCIN 250 MG/1
TABLET, FILM COATED ORAL
COMMUNITY
Start: 2020-09-08 | End: 2020-12-29

## 2020-09-09 RX ORDER — LIDOCAINE HYDROCHLORIDE 10 MG/ML
1 INJECTION INFILTRATION; PERINEURAL
Status: COMPLETED | OUTPATIENT
Start: 2020-09-09 | End: 2020-09-09

## 2020-09-09 RX ORDER — AZITHROMYCIN 500 MG/1
1000 TABLET, FILM COATED ORAL DAILY
Qty: 2 TABLET | Refills: 0 | Status: SHIPPED | OUTPATIENT
Start: 2020-09-09 | End: 2020-09-10

## 2020-09-09 RX ADMIN — LIDOCAINE HYDROCHLORIDE 1 ML: 10 INJECTION INFILTRATION; PERINEURAL at 11:09

## 2020-09-09 RX ADMIN — CEFTRIAXONE 250 MG: 1 INJECTION, POWDER, FOR SOLUTION INTRAMUSCULAR; INTRAVENOUS at 11:09

## 2020-09-09 NOTE — PROGRESS NOTES
"Subjective:       Patient ID: Madison Hill is a 54 y.o. female.    Vitals:  height is 5' 5" (1.651 m) and weight is 86.2 kg (190 lb). Her temporal temperature is 97.3 °F (36.3 °C). Her blood pressure is 160/88 (abnormal) and her pulse is 92. Her respiration is 16 and oxygen saturation is 97%.     Chief Complaint: Exposure to STD (VAG BURNING, FISH SMELL, D/C)    Patient states while she was having intercourse last Sunday the condom broke.  States she started having a fishy odorous vaginal discharge.  She states that her partner's asymptomatic however she would still like to be treated for STDs.  Denies any vaginal lesions or bumps.    Exposure to STD   The patient's primary symptoms include a discharge. The patient's pertinent negatives include no dyspareunia, dysuria, genital itching, genital lesions, genital rash or pelvic pain. This is a new problem. The current episode started 1 to 4 weeks ago (X2 WEEKS). The problem has been unchanged. The vaginal discharge was white. Associate symptoms include a genital odor. Pertinent negatives include no abdominal pain, anorexia, diaphoresis, fever, rectal pain, sore throat or urinary frequency. She has tried nothing for the symptoms.       Constitution: Negative for chills, sweating and fever.   HENT: Negative for sore throat.    Neck: Negative for painful lymph nodes.   Gastrointestinal: Negative for abdominal pain, nausea, vomiting and rectal pain.   Genitourinary: Positive for vaginal discharge and vaginal odor. Negative for dysuria, frequency, urgency, urine decreased, hematuria, history of kidney stones, painful menstruation, irregular menstruation, missed menses, heavy menstrual bleeding, ovarian cysts, genital trauma, vaginal pain, vaginal bleeding, painful intercourse, genital sore, painful ejaculation and pelvic pain.        VAG BURNING, PT STATES SHE HAD INTERCOURSE AND THE CONDOM BROKE.   Musculoskeletal: Negative for back pain.   Skin: Negative for rash and " lesion.   Hematologic/Lymphatic: Negative for swollen lymph nodes.       Objective:      Physical Exam   Constitutional: She is oriented to person, place, and time. She appears well-developed.   HENT:   Head: Normocephalic and atraumatic.   Ears:   Right Ear: External ear normal.   Left Ear: External ear normal.   Nose: Nose normal.   Mouth/Throat: Mucous membranes are normal.   Eyes: Conjunctivae and lids are normal.   Neck: Trachea normal and full passive range of motion without pain. Neck supple.   Cardiovascular: Normal rate, regular rhythm and normal heart sounds.   Pulmonary/Chest: Effort normal and breath sounds normal. No respiratory distress.   Abdominal: Soft. Normal appearance and bowel sounds are normal. She exhibits no distension, no abdominal bruit, no pulsatile midline mass and no mass. There is no abdominal tenderness. There is no rebound, no guarding, no tenderness at McBurney's point, negative Cadet's sign, no left CVA tenderness, negative Rovsing's sign, negative psoas sign and no right CVA tenderness.   Genitourinary:         Comments: Refused vaginal exam     Musculoskeletal: Normal range of motion.   Neurological: She is alert and oriented to person, place, and time. She has normal strength.   Skin: Skin is warm, dry, intact, not diaphoretic and not pale. Psychiatric: Her speech is normal and behavior is normal. Judgment and thought content normal.   Nursing note and vitals reviewed.        Assessment:       1. Vaginal burning    2. Vaginal odor    3. BV (bacterial vaginosis)        Plan:         Vaginal burning  -     POCT Urinalysis, Dipstick, Automated, W/O Scope  -     Bv, Trich, Candida by DNA Probe (Swab Only)  -     C. trachomatis/N. gonorrhoeae by AMP DNA [facility]; Urine    Vaginal odor  -     Bv, Trich, Candida by DNA Probe (Swab Only)  -     C. trachomatis/N. gonorrhoeae by AMP DNA [facility]; Urine  -     cefTRIAXone injection 250 mg  -     lidocaine HCL 10 mg/ml (1%) injection 1 mL  -      azithromycin (ZITHROMAX) 500 MG tablet; Take 2 tablets (1,000 mg total) by mouth once daily. for 1 day  Dispense: 2 tablet; Refill: 0  -     metroNIDAZOLE (FLAGYL) 500 MG tablet; Take 1 tablet (500 mg total) by mouth 3 (three) times daily. for 7 days  Dispense: 21 tablet; Refill: 0    BV (bacterial vaginosis)    Discussed diagnosis with patient as well as treatment and home care.  Discussed to remain abstinent until her lab results have returned.  Discussed return to clinic precautions vs ER precautions. All patients questions answered. Patient verbalized understanding. Patient agreed with plan of care.         Bacterial Vaginosis    You have a vaginal infection called bacterial vaginosis (BV). Both good and bad bacteria are present in a healthy vagina. BV occurs when these bacteria get out of balance. The number of bad bacteria increase. And the number of good bacteria decrease.  BV may or may not cause symptoms. If symptoms do occur, they can include:  · Thin, gray, milky-white, or sometimes green discharge  · Unpleasant odor or fishy smell  · Itching, burning, or pain in or around the vagina  It is not known what causes BV, but certain factors can make the problem more likely. This can include:  · Douching  · Having sex with a new partner  · Having sex with more than one partner  BV will sometimes go away on its own. But treatment is usually recommended. This is because untreated BV can increase the risk of more serious health problems such as:  · Pelvic inflammatory disease (PID)  ·  delivery (giving birth to a baby early if youre pregnant)  · HIV and certain other sexually transmitted diseases (STDs)  · Infection after surgery on the reproductive organs  Home care  General care  · BV is most often treated with medicines called antibiotics. These may be given as pills or as a vaginal cream. If antibiotics are prescribed, be sure to use them exactly as directed. Also, be sure to complete all of the  medicine, even if your symptoms go away.  · Avoid douching or having sex during treatment.  · If you have sex with a female partner, ask your healthcare provider if she should also be treated.  Prevention  · Limit or avoid douching.  · Avoid having sex. If you do have sex, then take steps to lower your risk:  ¨ Use condoms when having sex.  ¨ Limit the number of partners you have sex with.  Follow-up care  Follow up with your healthcare provider, or as advised.  When to seek medical advice  Call your healthcare provider right away if:  · You have a fever of 100.4ºF (38ºC) or higher, or as directed by your provider.  · Your symptoms worsen, or they dont go away within a few days of starting treatment.  · You have new pain in the lower belly or pelvic region.  · You have side effects that bother you or a reaction to the pills or cream youre prescribed.  · You or any partners you have sex with have new symptoms, such as a rash, joint pain, or sores.  Date Last Reviewed: 7/30/2015 © 2000-2017 [employer]. [address] [place] [address]. All rights reserved. This information is not intended as a substitute for professional medical care. Always follow your healthcare professional's instructions.        Preventing Vaginitis     Use mild, unscented soap when you bathe or shower to avoid irritating your vagina.    Vaginitis is irritation or infection of the vagina or vulva (the outside opening of the vagina). Vaginitis can be caused by bacteria, viruses, parasites, or yeast. Chemicals (such as in perfumes or soaps or in spermicides) can sometimes be a cause. Vaginitis can be caused by hormone changes in pregnancy or with menopause. You can help prevent vaginitis. Follow the tips below. And see your healthcare provider if you have any symptoms.  Hygiene  · Avoid chemicals. Do not use vaginal sprays. Do not use scented toilet paper or tampons that are scented. Sprays and scents have chemicals that  can irritate your vagina.  · Do not douche unless you are told to by your healthcare provider. Douching is rarely needed. And it upsets the normal balance in the vagina.  · Wash yourself well. Wash the outer vaginal area (vulva) every day with mild, unscented soap. Keep it as dry as possible.  · Wipe correctly. Make sure to wipe from front to back after a bowel movement. This helps keep from spreading bacteria from your anus to your vagina.  · Change your tampon often. During your period, make sure to change your tampon as often as directed on the package. This allows the normal flow of vaginal discharge and blood.  Lifestyle  · Limit your number of sexual partners. The more partners you have, the greater your risk of infection. Using condoms helps reduce your risk.  · Get enough sleep. Sleep helps keep your bodys immune system healthy. This helps you fight infection.  · Lose weight, if needed. Excess weight can reduce air circulation around your vagina. This can increase your risk of infection.  · Exercise regularly. Regular activity helps keep your body healthy.  · Take antibiotics only as directed. Antibiotics can change the normal chemical balance in the vagina.    Clothing  · Dont sit in wet clothes. Yeast thrives when its warm and damp.  · Dont wear tight pants. And dont wear tights, leggings, or hose without a cotton crotch. These types of clothing trap warmth and moisture.  · Wear cotton underwear. Cotton lets air circulate around the vagina.  Symptoms of vaginitis  · Irritation, swelling, or itching of the genital area  · Vaginal discharge  · Bad vaginal odor  · Pain or burning during urination   Date Last Reviewed: 12/1/2016 © 2000-2017 [employer]. [address] [place] [address], [place] [address]. All rights reserved. This information is not intended as a substitute for professional medical care. Always follow your healthcare professional's instructions.      Please follow up with your  Primary care provider within 2-5 days if your signs and symptoms have not resolved or worsen.     If your condition worsens or fails to improve we recommend that you receive another evaluation at the emergency room immediately or contact your primary medical clinic to discuss your concerns.   You must understand that you have received an Urgent Care treatment only and that you may be released before all of your medical problems are known or treated. You, the patient, will arrange for follow up care as instructed.     RED FLAGS/WARNING SYMPTOMS DISCUSSED WITH PATIENT THAT WOULD WARRANT EMERGENT MEDICAL ATTENTION. PATIENT VERBALIZED UNDERSTANDING.

## 2020-09-09 NOTE — PATIENT INSTRUCTIONS
Bacterial Vaginosis    You have a vaginal infection called bacterial vaginosis (BV). Both good and bad bacteria are present in a healthy vagina. BV occurs when these bacteria get out of balance. The number of bad bacteria increase. And the number of good bacteria decrease.  BV may or may not cause symptoms. If symptoms do occur, they can include:  · Thin, gray, milky-white, or sometimes green discharge  · Unpleasant odor or fishy smell  · Itching, burning, or pain in or around the vagina  It is not known what causes BV, but certain factors can make the problem more likely. This can include:  · Douching  · Having sex with a new partner  · Having sex with more than one partner  BV will sometimes go away on its own. But treatment is usually recommended. This is because untreated BV can increase the risk of more serious health problems such as:  · Pelvic inflammatory disease (PID)  ·  delivery (giving birth to a baby early if youre pregnant)  · HIV and certain other sexually transmitted diseases (STDs)  · Infection after surgery on the reproductive organs  Home care  General care  · BV is most often treated with medicines called antibiotics. These may be given as pills or as a vaginal cream. If antibiotics are prescribed, be sure to use them exactly as directed. Also, be sure to complete all of the medicine, even if your symptoms go away.  · Avoid douching or having sex during treatment.  · If you have sex with a female partner, ask your healthcare provider if she should also be treated.  Prevention  · Limit or avoid douching.  · Avoid having sex. If you do have sex, then take steps to lower your risk:  ¨ Use condoms when having sex.  ¨ Limit the number of partners you have sex with.  Follow-up care  Follow up with your healthcare provider, or as advised.  When to seek medical advice  Call your healthcare provider right away if:  · You have a fever of 100.4ºF (38ºC) or higher, or as directed by your  provider.  · Your symptoms worsen, or they dont go away within a few days of starting treatment.  · You have new pain in the lower belly or pelvic region.  · You have side effects that bother you or a reaction to the pills or cream youre prescribed.  · You or any partners you have sex with have new symptoms, such as a rash, joint pain, or sores.  Date Last Reviewed: 7/30/2015 © 2000-2017 [employer]. [address] [place] [address], [place] [address]. All rights reserved. This information is not intended as a substitute for professional medical care. Always follow your healthcare professional's instructions.        Preventing Vaginitis     Use mild, unscented soap when you bathe or shower to avoid irritating your vagina.    Vaginitis is irritation or infection of the vagina or vulva (the outside opening of the vagina). Vaginitis can be caused by bacteria, viruses, parasites, or yeast. Chemicals (such as in perfumes or soaps or in spermicides) can sometimes be a cause. Vaginitis can be caused by hormone changes in pregnancy or with menopause. You can help prevent vaginitis. Follow the tips below. And see your healthcare provider if you have any symptoms.  Hygiene  · Avoid chemicals. Do not use vaginal sprays. Do not use scented toilet paper or tampons that are scented. Sprays and scents have chemicals that can irritate your vagina.  · Do not douche unless you are told to by your healthcare provider. Douching is rarely needed. And it upsets the normal balance in the vagina.  · Wash yourself well. Wash the outer vaginal area (vulva) every day with mild, unscented soap. Keep it as dry as possible.  · Wipe correctly. Make sure to wipe from front to back after a bowel movement. This helps keep from spreading bacteria from your anus to your vagina.  · Change your tampon often. During your period, make sure to change your tampon as often as directed on the package. This allows the normal flow of vaginal discharge  and blood.  Lifestyle  · Limit your number of sexual partners. The more partners you have, the greater your risk of infection. Using condoms helps reduce your risk.  · Get enough sleep. Sleep helps keep your bodys immune system healthy. This helps you fight infection.  · Lose weight, if needed. Excess weight can reduce air circulation around your vagina. This can increase your risk of infection.  · Exercise regularly. Regular activity helps keep your body healthy.  · Take antibiotics only as directed. Antibiotics can change the normal chemical balance in the vagina.    Clothing  · Dont sit in wet clothes. Yeast thrives when its warm and damp.  · Dont wear tight pants. And dont wear tights, leggings, or hose without a cotton crotch. These types of clothing trap warmth and moisture.  · Wear cotton underwear. Cotton lets air circulate around the vagina.  Symptoms of vaginitis  · Irritation, swelling, or itching of the genital area  · Vaginal discharge  · Bad vaginal odor  · Pain or burning during urination   Date Last Reviewed: 12/1/2016 © 2000-2017 [employer]. [address] [place] [address]. All rights reserved. This information is not intended as a substitute for professional medical care. Always follow your healthcare professional's instructions.      Please follow up with your Primary care provider within 2-5 days if your signs and symptoms have not resolved or worsen.     If your condition worsens or fails to improve we recommend that you receive another evaluation at the emergency room immediately or contact your primary medical clinic to discuss your concerns.   You must understand that you have received an Urgent Care treatment only and that you may be released before all of your medical problems are known or treated. You, the patient, will arrange for follow up care as instructed.     RED FLAGS/WARNING SYMPTOMS DISCUSSED WITH PATIENT THAT WOULD WARRANT EMERGENT MEDICAL  ATTENTION. PATIENT VERBALIZED UNDERSTANDING.

## 2020-09-10 LAB
CANDIDA RRNA VAG QL PROBE: NEGATIVE
G VAGINALIS RRNA GENITAL QL PROBE: POSITIVE
T VAGINALIS RRNA GENITAL QL PROBE: NEGATIVE

## 2020-09-11 ENCOUNTER — TELEPHONE (OUTPATIENT)
Dept: URGENT CARE | Facility: CLINIC | Age: 55
End: 2020-09-11

## 2020-09-11 NOTE — TELEPHONE ENCOUNTER
Discussed lab results with patient.  Discussed negative Trichomonas and yeast infection.  Discussed positive BV.  Patient had not started that medication yet.  Discussed to begin taking Flagyl.  Discussed her gonorrhea chlamydia test still pending.  All her questions were answered she verbalized understanding.

## 2020-09-16 ENCOUNTER — TELEPHONE (OUTPATIENT)
Dept: URGENT CARE | Facility: CLINIC | Age: 55
End: 2020-09-16

## 2020-09-16 NOTE — TELEPHONE ENCOUNTER
Patient wanted to speak to provider, returned patient's call.  Discussed vaginosis panel results again with her, her symptoms have greatly improved after starting the flagyl.  She wanted her GC results, discussed not back yet & that there is a delay due to national shortage of test supplies.  She verbalized understanding.

## 2020-10-07 LAB
C TRACH DNA SPEC QL NAA+PROBE: NOT DETECTED
N GONORRHOEA DNA SPEC QL NAA+PROBE: NOT DETECTED

## 2020-12-18 ENCOUNTER — TELEPHONE (OUTPATIENT)
Dept: HEPATOLOGY | Facility: CLINIC | Age: 55
End: 2020-12-18

## 2020-12-18 DIAGNOSIS — B18.1 CHRONIC HEPATITIS B: Primary | ICD-10-CM

## 2020-12-18 NOTE — TELEPHONE ENCOUNTER
----- Message from Agnes Jain sent at 12/18/2020  9:25 AM CST -----  Regarding: Reschedule Appts  Contact: Patient  Patient is requesting a call back to reschedule appts for labs and to see physician on today for another date   Patient stated she is not feeling well and would like to postpone appts     Patient can be reached at [phone]

## 2020-12-29 ENCOUNTER — OFFICE VISIT (OUTPATIENT)
Dept: HEPATOLOGY | Facility: CLINIC | Age: 55
End: 2020-12-29
Payer: MEDICAID

## 2020-12-29 ENCOUNTER — HOSPITAL ENCOUNTER (OUTPATIENT)
Dept: RADIOLOGY | Facility: HOSPITAL | Age: 55
Discharge: HOME | End: 2020-12-29
Attending: PHYSICIAN ASSISTANT
Payer: MEDICAID

## 2020-12-29 VITALS
OXYGEN SATURATION: 100 % | RESPIRATION RATE: 18 BRPM | DIASTOLIC BLOOD PRESSURE: 82 MMHG | HEART RATE: 70 BPM | BODY MASS INDEX: 31.7 KG/M2 | HEIGHT: 65 IN | WEIGHT: 190.25 LBS | TEMPERATURE: 98 F | SYSTOLIC BLOOD PRESSURE: 164 MMHG

## 2020-12-29 DIAGNOSIS — B18.1 CHRONIC HEPATITIS B: ICD-10-CM

## 2020-12-29 DIAGNOSIS — B18.1 CHRONIC HEPATITIS B: Primary | Chronic | ICD-10-CM

## 2020-12-29 DIAGNOSIS — Z78.9 ALCOHOL USE: ICD-10-CM

## 2020-12-29 PROCEDURE — 99214 OFFICE O/P EST MOD 30 MIN: CPT | Mod: S$PBB,,, | Performed by: NURSE PRACTITIONER

## 2020-12-29 PROCEDURE — 99999 PR PBB SHADOW E&M-EST. PATIENT-LVL IV: CPT | Mod: PBBFAC,,, | Performed by: NURSE PRACTITIONER

## 2020-12-29 PROCEDURE — 99214 OFFICE O/P EST MOD 30 MIN: CPT | Mod: PBBFAC,25 | Performed by: NURSE PRACTITIONER

## 2020-12-29 PROCEDURE — 99214 PR OFFICE/OUTPT VISIT, EST, LEVL IV, 30-39 MIN: ICD-10-PCS | Mod: S$PBB,,, | Performed by: NURSE PRACTITIONER

## 2020-12-29 PROCEDURE — 76700 US EXAM ABDOM COMPLETE: CPT | Mod: TC

## 2020-12-29 PROCEDURE — 99999 PR PBB SHADOW E&M-EST. PATIENT-LVL IV: ICD-10-PCS | Mod: PBBFAC,,, | Performed by: NURSE PRACTITIONER

## 2020-12-29 PROCEDURE — 76700 US ABDOMEN COMPLETE: ICD-10-PCS | Mod: 26,,, | Performed by: RADIOLOGY

## 2020-12-29 PROCEDURE — 76700 US EXAM ABDOM COMPLETE: CPT | Mod: 26,,, | Performed by: RADIOLOGY

## 2020-12-29 RX ORDER — TENOFOVIR DISOPROXIL FUMARATE 300 MG/1
300 TABLET, FILM COATED ORAL DAILY
Qty: 30 TABLET | Refills: 6 | Status: SHIPPED | OUTPATIENT
Start: 2020-12-29 | End: 2021-03-16 | Stop reason: SDUPTHER

## 2020-12-29 NOTE — PATIENT INSTRUCTIONS
1. Follow up with your PCP for your right upper abdominal pain. [facility] [phone]  2. Let me know if you have issues getting the Hep B medication Tenofovir from [facility], we typically will sent it to the [facility]  3. STOP all alcohol       -- Avoid alcohol. Avoid raw seafood.   -- Hepatitis B is spread through blood and bodily fluids. Do not share  razors/toothbrushes, etc, with others   -- it is possible that Hepatitis B can cause scar tissue in the liver, which can progress to cirrhosis.   -- Hepatitis B, in some cases, has a higher risk of liver cancer (hepatocellular carcinoma), especially with active hepatitis B infection. We will perform liver cancer screening every six months with ultrasound and AFP along with a clinic visit every 6 months  -- If you develop cirrhosis, it is important that we monitor you closely, as cirrhosis can cause liver cancer, liver failure, liver transplant, death.   -- Limit acetaminophen to 2000mg daily.  -- Recommended that all 1st degree relatives, household members and sexual contacts are screened for Hepatitis B. If they are negative for Hepatitis B, they should be vaccinated against Hepatitis B to protect themselves from joe the virus  -- It is important for all current and previous sexual partners to be tested for Hepatitis B as well as complete Hep B vaccination. For sexual partners who have not completed the Hep B vaccine series, barrier sexual protection is recommended to prevent spread of the virus.  -- It is important that you take your Hepatitis B medication as prescribed without any missed doses, as missing doses or stopping the medication can cause the Hepatitis B virus to change and make it difficult to treat

## 2020-12-29 NOTE — PROGRESS NOTES
[facility] Established Patient Visit    Reason for Visit:  Hep B    PCP: no reports no current PCP    HPI:  This is a 55 y.o. female with PMH noted below, here for follow up of   chronic Hepatitis B, eAb positive, diagnosed in 2014    patient was started on Viread via infectious disease physician Alycia (9/2019) after patient had detectable DNA    Hep B risk factor includes drug use in the past    Previous serologic w/u negative for Hep A and C in 2018 and 2020    Prior serologic workup:   Lab Results   Component Value Date    HEPBSAG Positive (A) 06/04/2020    HEPCAB Negative 06/04/2020    HEPAIGM Negative 10/10/2018     Risk factors for NAFLD include obesity,  HLD    Liver fibrosis staging:  -- Liver biopsy in the past in 2014 that noted F2, chronic hepatitis   --- Fibroscan done 6/2020 noted F0-1 (kPA 5.6), S1 ()    Interval HPI: Presents today alone. Does report some missed doses/ decreased compliance with Viread.   Does have household contacts, she thinks they have been vaccinated   Also reports drinking alcohol heavily (wine and liquor)  Also continues to report RUQ abd pain, no abnormalities noted on US, do not suspect it is related to liver, again advised pt to f/u with PCP or urgent care for further eval      Labs done 12/2020 show near normal transaminase levels, synthetic liver function  WNL      Lab Results   Component Value Date    ALT 29 12/29/2020    AST 23 12/29/2020    ALKPHOS 97 12/29/2020    BILITOT 0.4 12/29/2020    ALBUMIN 3.7 12/29/2020    INR 0.9 12/29/2020     12/29/2020       Hep C testing and HIV testing negative    Hep B labs :  -- + eAb, neg eAg   6/2020  -- DNA <10   6/2020, pending labs today   -- + sAg   6/2020, pending labs today     HCC screening:  Abd U/S done 12/2020 showed no lesions, repeat due 6/2021  AFP to be done today, then due 6/2021  Previous EGD : not currently indicated     Denies family history of liver disease     +Immunity to Hep A       Denies jaundice, dark urine, abdominal distention, hematemesis, melena, slowed mentation. No abnormal skin rashes. No generalized joint or muscle pain.     PMHX:  has a past medical history of Anxiety, Arthritis, Cocaine use, Depression, GERD (gastroesophageal reflux disease), Hepatitis B, Hyperlipidemia, IBS (irritable bowel syndrome), Liver fibrosis (stage 2), and Sleep apnea.    PSHX:  has a past surgical history that includes Hysterectomy; Liver biopsy (10/16/2014); Cholecystectomy (2018); and Total Reduction Mammoplasty (Bilateral, 3/3/2020).    The patient's social and family histories were reviewed by me and updated in the appropriate section of the electronic medical record.    Review of patient's allergies indicates:   Allergen Reactions    Corticosteroids (glucocorticoids) Palpitations    Morphine Anxiety       Current Outpatient Medications on File Prior to Visit   Medication Sig Dispense Refill    atorvastatin (LIPITOR) 80 MG tablet Take 1 tablet (80 mg total) by mouth once daily. 90 tablet 3    linaCLOtide (LINZESS) 290 mcg Cap capsule Take 1 capsule (290 mcg total) by mouth once daily. (Patient taking differently: Take 290 mcg by mouth as needed. ) 90 capsule 3    montelukast (SINGULAIR) 10 mg tablet Take 10 mg by mouth once daily.       omeprazole (PRILOSEC) 20 MG capsule Take 20 mg by mouth once daily.      pantoprazole (PROTONIX) 40 MG tablet Take 40 mg by mouth once daily.       tenofovir (VIREAD) 300 mg Tab Take 1 tablet (300 mg total) by mouth once daily. 30 tablet 11    acetaminophen-codeine 300-60mg (TYLENOL #4) 300-60 mg Tab       azithromycin (Z-BENJA) 250 MG tablet       cyclobenzaprine (FLEXERIL) 10 MG tablet       rosuvastatin (CRESTOR) 40 MG Tab 40 mg once daily.        No current facility-administered medications on file prior to visit.        SOCIAL HISTORY:   Social History     Tobacco Use   Smoking Status Light Tobacco Smoker    Packs/day: 0.50    Years: 45.00    Pack  "years: 22.50    Types: Cigarettes   Smokeless Tobacco Never Used       Social History     Substance and Sexual Activity   Alcohol Use Not Currently    Alcohol/week: 0.0 standard drinks    Frequency: 2-4 times a month    Drinks per session: 1 or 2    Comment: 2 months ago        Social History     Substance and Sexual Activity   Drug Use Not Currently    Types: Cocaine    Comment: three years        ROS:   GENERAL: Denies fever, chills, weight loss/gain, fatigue  HEENT: Denies headaches, dizziness, vision/hearing changes  CARDIOVASCULAR: Denies chest pain, palpitations, or edema  RESPIRATORY: Denies dyspnea, cough  GI: Denies abdominal pain, rectal bleeding, nausea, vomiting. No change in bowel pattern or color  : Denies dysuria, hematuria   SKIN: Denies rash, itching   NEURO: Denies confusion, memory loss, or mood changes  PSYCH: Denies depression or anxiety  HEME/LYMPH: Denies easy bruising or bleeding    Objective Findings:    PHYSICAL EXAM:   Friendly Black or  female, in no acute distress; alert and oriented to person, place and time  VITALS: BP (!) 164/82 (BP Location: Left arm, Patient Position: Sitting, BP Method: Medium (Automatic))   Pulse 70   Temp 98 °F (36.7 °C)   Resp 18   Ht 5' 5" (1.651 m)   Wt 86.3 kg (190 lb 4.1 oz)   SpO2 100%   BMI 31.66 kg/m²   HENT: Normocephalic, without obvious abnormality. Oral mucosa pink and moist. Dentition good.  EYES: Sclerae anicteric. No conjunctival pallor.   NECK: Supple. No masses or cervical adenopathy.  CARDIOVASCULAR: Regular rate and rhythm. No murmurs.  RESPIRATORY: Normal respiratory effort. BBS CTA. No wheezes or crackles.  GI: Soft, non-tender, non-distended. No hepatosplenomegaly. No masses palpable. No ascites.  EXTREMITIES:  No clubbing, cyanosis or edema.  SKIN: Warm and dry. No jaundice. No rashes noted to exposed skin. No telangectasias noted. No palmar erythema.  NEURO:  Normal gait. No asterixis.  PSYCH:  Memory intact. " Thought and speech pattern appropriate. Behavior normal. No depression or anxiety noted.    DIAGNOSTIC STUDIES:  EGD- not indicated     ABD. U/S-    Done 12/2020    FIBROSCAN - due 6/2021      EDUCATION:  See instructions discussed with patient in Instructions section of the After Visit Summary       ASSESSMENT & PLAN:  55 y.o. Black or  female with:  1. Chronic hepatitis B,  E Ab positive,  diagnosed 2014  -- Treatment: Viread 300 mg daily, refilled   -- transaminases near normal   -- HBV DNA to be done today   -- synthetic liver function WNL  -- + immunity to Hep A per labs   -- risk factors for transmission : drug use   -- family members or partners that need to be tested: pt reports household contacts have been tested and vaccinated to her knowledge   -- Fibroscan without fibrosis, will repeat 6/2021  -- screening for Hep C and HIV negative  -- HCC screening Q 6 months with AFP and abd. U/S - both next due 6/2021  -- Hep B counseling noted above discussed. Sexual partners and family members in household need to be tested and vaccinated if negative. Must use protection for sex (Hep B)   -- Labs and US q6 months, fibroscan q year    2. Alcohol abuse  -- reinforced need to completely abstain from alcohol given higher risk of liver fibrosis and decompensation with alcohol + Hep B, pt aware    3. RUQ pain  -- Also continues to report RUQ abd pain, no abnormalities noted on US, do not suspect it is related to liver, again advised pt to f/u with PCP or urgent care for further eval      Follow up in about 6 months (around 6/29/2021). with labs and US 1 week before    Thank you for allowing me to participate in the care of Madison Fink, NPMaribellC    CC'ed note to:   No PCP

## 2021-03-16 ENCOUNTER — TELEPHONE (OUTPATIENT)
Dept: HEPATOLOGY | Facility: CLINIC | Age: 56
End: 2021-03-16

## 2021-03-16 DIAGNOSIS — B18.1 CHRONIC HEPATITIS B: Chronic | ICD-10-CM

## 2021-03-16 RX ORDER — TENOFOVIR DISOPROXIL FUMARATE 300 MG/1
300 TABLET, FILM COATED ORAL DAILY
Qty: 30 TABLET | Refills: 6 | Status: SHIPPED | OUTPATIENT
Start: 2021-03-16 | End: 2021-09-08 | Stop reason: SDUPTHER

## 2021-04-26 ENCOUNTER — PATIENT MESSAGE (OUTPATIENT)
Dept: RESEARCH | Facility: HOSPITAL | Age: 56
End: 2021-04-26

## 2021-05-28 ENCOUNTER — TELEPHONE (OUTPATIENT)
Dept: FAMILY MEDICINE | Facility: CLINIC | Age: 56
End: 2021-05-28

## 2021-07-01 ENCOUNTER — PATIENT MESSAGE (OUTPATIENT)
Dept: ADMINISTRATIVE | Facility: OTHER | Age: 56
End: 2021-07-01

## 2021-09-08 DIAGNOSIS — B18.1 CHRONIC HEPATITIS B: Chronic | ICD-10-CM

## 2021-09-09 ENCOUNTER — TELEPHONE (OUTPATIENT)
Dept: HEPATOLOGY | Facility: CLINIC | Age: 56
End: 2021-09-09

## 2021-09-09 RX ORDER — TENOFOVIR DISOPROXIL FUMARATE 300 MG/1
300 TABLET, FILM COATED ORAL DAILY
Qty: 30 TABLET | Refills: 0 | Status: SHIPPED | OUTPATIENT
Start: 2021-09-09 | End: 2021-09-10 | Stop reason: SDUPTHER

## 2021-09-10 ENCOUNTER — TELEPHONE (OUTPATIENT)
Dept: HEPATOLOGY | Facility: CLINIC | Age: 56
End: 2021-09-10

## 2021-09-10 DIAGNOSIS — B18.1 CHRONIC HEPATITIS B: Chronic | ICD-10-CM

## 2021-09-10 RX ORDER — TENOFOVIR DISOPROXIL FUMARATE 300 MG/1
300 TABLET, FILM COATED ORAL DAILY
Qty: 30 TABLET | Refills: 0 | Status: SHIPPED | OUTPATIENT
Start: 2021-09-10

## 2022-05-07 DIAGNOSIS — B18.1 CHRONIC HEPATITIS B: Chronic | ICD-10-CM

## 2022-05-07 NOTE — LETTER
May 9, 2022    Madison Hill  [address] [facility] [address]        [facility]  Clinic 1st Floor  [address]  [place] [address]  Phone: [phone] Dear Ms. Sergio:    We are writing to you because we have made multiple attempts to reach you by phone and have been unsuccessful.      You are overdue for your hepatology follow up, which is required every 6 months and your last visit was in 2020. Please call the hepatology/ Liver Center Clinic [phone] to schedule your appt ASAP. We have providers in [facility] now if that works better for you.     Sincerely,      [facility] 1st Floor    [address] [place] [address] [phone]

## 2022-05-09 RX ORDER — TENOFOVIR DISOPROXIL FUMARATE 300 MG/1
TABLET, FILM COATED ORAL
Qty: 30 TABLET | Refills: 0 | OUTPATIENT
Start: 2022-05-09

## 2022-05-09 NOTE — TELEPHONE ENCOUNTER
Attempted to call pt about refill received, pt lost to f/u, last seen in 2020. Pt did not answer, attempted to leave message but no voicemail. Mailed letter to address on file

## 2022-05-19 ENCOUNTER — TELEPHONE (OUTPATIENT)
Dept: HEPATOLOGY | Facility: CLINIC | Age: 57
End: 2022-05-19
Payer: MEDICAID

## 2022-05-19 DIAGNOSIS — B18.1 CHRONIC HEPATITIS B: Primary | ICD-10-CM

## 2022-05-19 NOTE — TELEPHONE ENCOUNTER
Contacted patient. Scheduled her labs and u/s in [place]. I offered her to be scheduled in [place] hepatology clinic. Patient declined. Patient stated that she likes to stay with Kami Fink NP and she will come to [place] to see her. Mailed appointment letter to patient.

## 2022-05-19 NOTE — TELEPHONE ENCOUNTER
Please schedule pt f/u with Dr. Christiansen in [facility] with labs and US a few days before. She lives much closer to [place] and would attend follow up visits more if it were closer     Thanks !

## 2022-05-19 NOTE — TELEPHONE ENCOUNTER
----- Message from Amy Celeste MA sent at 5/19/2022 12:39 PM CDT -----  Regarding: FW: follow up  Contact: Madison  Need labs and fibroscan order. thanks  ----- Message -----  From: Martinez Alba  Sent: 5/19/2022  12:36 PM CDT  To: Danae Mcclure Staff  Subject: follow up                                        Patient called in to schedule a follow up, may need labs & Fibroscan set up.     Contact: [phone]

## 2022-05-19 NOTE — TELEPHONE ENCOUNTER
Called pt to assure that she will attend her visit in June, pt confirmed that she will attend visit

## 2022-07-05 ENCOUNTER — TELEPHONE (OUTPATIENT)
Dept: HEPATOLOGY | Facility: CLINIC | Age: 57
End: 2022-07-05
Payer: MEDICAID

## 2022-07-05 NOTE — TELEPHONE ENCOUNTER
Please contact pt and schedule f/u with Dr. Christiansen first available appt in [place]. Pt cannot continue to follow with me because she misses all of her appts (often due to travel) and she needs to see a provider consistently to continue to fill her Hep B medications    Thanks !

## 2022-07-05 NOTE — TELEPHONE ENCOUNTER
"MA called patient. Directed to network  that says "the number you have dialed is unreachable." no other number on file. I went ahead schedule an appointment in [place] and mail appointment letter to patient.    Instructions:   Please contact pt and schedule f/u with Dr. Christiansen first available appt in [place]. Pt cannot continue to follow with me because she misses all of her appts (often due to travel) and she needs to see a provider consistently to continue to fill her Hep B medications    "

## 2022-09-19 DIAGNOSIS — B18.1 CHRONIC HEPATITIS B: Chronic | ICD-10-CM

## 2022-09-19 NOTE — TELEPHONE ENCOUNTER
----- Message from Kami Fink NP sent at 9/19/2022  2:49 PM CDT -----  Regarding: RE: call back  Please have her see me or do a video visit sometime this week so that I can refill her medication    Thanks!  ----- Message -----  From: Amy Celeste MA  Sent: 9/19/2022   2:45 PM CDT  To: Kami Fink NP  Subject: FW: call back                                    Hey Ms. Mcclure, this patient requesting a refill for her hep b medicine. She said she badly need it. I tried to reschedule her follow up appointment with Dr. Christiansen in [place] next month if Ms Shira Sutton can open a slots for her.  ----- Message -----  From: Nate Reyes  Sent: 9/19/2022   1:08 PM CDT  To: Danae Mcclure Staff  Subject: call back                                        Pt call to schedule appt    Call

## 2022-09-19 NOTE — TELEPHONE ENCOUNTER
Spoke with patient and got her scheduled for a virtual visit. Told her that provider needs to see her first before she can refill her medication. Sent to her a new code to register again in [employer] account. Instructed her to do the e-pre check and log in 10 mins prior to visit. Patient verbalized understanding.

## 2022-09-21 ENCOUNTER — TELEPHONE (OUTPATIENT)
Dept: HEPATOLOGY | Facility: CLINIC | Age: 57
End: 2022-09-21
Payer: MEDICAID

## 2022-09-21 NOTE — TELEPHONE ENCOUNTER
Contacted patient to get an appointment to see Kami Fink so that she can refill her medication. I sent NGRAINt code last Monday for her to set [employer] account so she do virtual visit. I contacted her today again to remind her to set up her NGRAINt for her appointment this afternoon. Patient no answer and unable to leave .

## 2022-09-21 NOTE — TELEPHONE ENCOUNTER
----- Message from Kami Fink NP sent at 9/19/2022  2:49 PM CDT -----  Regarding: RE: call back  Please have her see me or do a video visit sometime this week so that I can refill her medication    Thanks!  ----- Message -----  From: Amy Celeste MA  Sent: 9/19/2022   2:45 PM CDT  To: Kami Fink NP  Subject: FW: call back                                    Hey Ms. Mcclure, this patient requesting a refill for her hep b medicine. She said she badly need it. I tried to reschedule her follow up appointment with Dr. Christiansen in [place] next month if Ms Shira Sutton can open a slots for her.  ----- Message -----  From: Nate Reyes  Sent: 9/19/2022   1:08 PM CDT  To: Danae Mcclure Staff  Subject: call back                                        Pt call to schedule appt    Call

## 2022-09-22 ENCOUNTER — TELEPHONE (OUTPATIENT)
Dept: HEPATOLOGY | Facility: CLINIC | Age: 57
End: 2022-09-22
Payer: MEDICAID

## 2022-09-22 DIAGNOSIS — B18.1 CHRONIC HEPATITIS B: Primary | ICD-10-CM

## 2022-09-26 RX ORDER — TENOFOVIR DISOPROXIL FUMARATE 300 MG/1
300 TABLET, FILM COATED ORAL DAILY
Qty: 30 TABLET | Refills: 0 | OUTPATIENT
Start: 2022-09-26

## 2022-10-27 ENCOUNTER — TELEPHONE (OUTPATIENT)
Dept: HEPATOLOGY | Facility: CLINIC | Age: 57
End: 2022-10-27
Payer: MEDICAID

## 2022-10-27 NOTE — TELEPHONE ENCOUNTER
----- Message from Luiz Phillips sent at 10/27/2022  3:58 PM CDT -----  Regarding: return call  Patient returning call to Donny patelt on 1/20/2022. Requesting a call back.    Call: [phone] (Mobile

## 2023-01-17 ENCOUNTER — TELEPHONE (OUTPATIENT)
Dept: HEPATOLOGY | Facility: CLINIC | Age: 58
End: 2023-01-17
Payer: MEDICAID

## 2023-01-17 NOTE — TELEPHONE ENCOUNTER
----- Message from Amy Celeste MA sent at 1/17/2023 11:38 AM CST -----  Regarding: FW: Lab order  Contact: [phone]  Hi Ms. Taylor,     Can you put external lab order for this patient?     Thank you so much.  ----- Message -----  From: Pattie Cheung MA  Sent: 1/17/2023  11:26 AM CST  To: Kuldip COOLEY Staff  Subject: Lab order                                        Patient is requesting lab order to [facility]. Please call and advise.

## 2023-01-17 NOTE — TELEPHONE ENCOUNTER
Patient was scheduled Labs at 11 am before 1:20 pm Office Visit on 1/20/23 with Dr GO Christiansen.  Patient cancelled the Labs. Requesting another location.  Spoke with Dr Christiansen.  Have Patient come in for appt I will decide what Labs are needed.    Call placed to the patient at [phone].  Message relayed from Dr GO Christiansen.  Please keep your appointment on 1/20/23. Dr Christiansen will discuss in Clinic what Labs are needed. So, I am not going to get labs done?  Dr Christiansen said he will let you know with the Visit.  Voiced understanding. Call ended.

## 2023-01-17 NOTE — TELEPHONE ENCOUNTER
----- Message from Pattie Cheung MA sent at 1/17/2023 11:22 AM CST -----  Regarding: Lab order  Contact: [phone]  Patient is requesting lab order to [facility]. Please call and advise.

## 2023-01-17 NOTE — TELEPHONE ENCOUNTER
Staff contacted patient and informed her that I will send an external labs order to [facility].

## 2023-01-20 ENCOUNTER — LAB VISIT (OUTPATIENT)
Dept: LAB | Facility: HOSPITAL | Age: 58
End: 2023-01-20
Attending: STUDENT IN AN ORGANIZED HEALTH CARE EDUCATION/TRAINING PROGRAM
Payer: MEDICAID

## 2023-01-20 ENCOUNTER — OFFICE VISIT (OUTPATIENT)
Dept: HEPATOLOGY | Facility: CLINIC | Age: 58
End: 2023-01-20
Payer: MEDICAID

## 2023-01-20 VITALS
HEART RATE: 96 BPM | DIASTOLIC BLOOD PRESSURE: 77 MMHG | SYSTOLIC BLOOD PRESSURE: 138 MMHG | BODY MASS INDEX: 28.14 KG/M2 | WEIGHT: 169.06 LBS

## 2023-01-20 DIAGNOSIS — B18.1 CHRONIC HEPATITIS B: ICD-10-CM

## 2023-01-20 DIAGNOSIS — B18.1 CHRONIC HEPATITIS B: Primary | ICD-10-CM

## 2023-01-20 LAB
AFP-TM SERPL-MCNC: 6.2 NG/ML
ALBUMIN SERPL-MCNC: 3.8 G/DL (ref 3.5–5)
ALBUMIN/GLOB SERPL: 0.9 RATIO (ref 1.1–2)
ALP SERPL-CCNC: 88 UNIT/L (ref 40–150)
ALT SERPL-CCNC: 27 UNIT/L (ref 0–55)
ANISOCYTOSIS BLD QL SMEAR: ABNORMAL
AST SERPL-CCNC: 22 UNIT/L (ref 5–34)
BASOPHILS # BLD AUTO: 0.02 X10(3)/MCL (ref 0–0.2)
BASOPHILS NFR BLD AUTO: 0.4 %
BILIRUBIN DIRECT+TOT PNL SERPL-MCNC: 0.3 MG/DL
BUN SERPL-MCNC: 16.3 MG/DL (ref 9.8–20.1)
CALCIUM SERPL-MCNC: 9.7 MG/DL (ref 8.4–10.2)
CHLORIDE SERPL-SCNC: 102 MMOL/L (ref 98–107)
CO2 SERPL-SCNC: 29 MMOL/L (ref 22–29)
CREAT SERPL-MCNC: 0.73 MG/DL (ref 0.55–1.02)
EOSINOPHIL # BLD AUTO: 0.11 X10(3)/MCL (ref 0–0.9)
EOSINOPHIL NFR BLD AUTO: 1.9 %
ERYTHROCYTE [DISTWIDTH] IN BLOOD BY AUTOMATED COUNT: 17.2 % (ref 11.5–17)
GFR SERPLBLD CREATININE-BSD FMLA CKD-EPI: >60 MLS/MIN/1.73/M2
GLOBULIN SER-MCNC: 4.1 GM/DL (ref 2.4–3.5)
GLUCOSE SERPL-MCNC: 102 MG/DL (ref 74–100)
HBV SURFACE AG SERPL QL IA: REACTIVE
HCT VFR BLD AUTO: 37.3 % (ref 37–47)
HGB BLD-MCNC: 11.3 GM/DL (ref 12–16)
IMM GRANULOCYTES # BLD AUTO: 0.01 X10(3)/MCL (ref 0–0.04)
IMM GRANULOCYTES NFR BLD AUTO: 0.2 %
INR BLD: 0.99 (ref 0–1.3)
LYMPHOCYTES # BLD AUTO: 2.41 X10(3)/MCL (ref 0.6–4.6)
LYMPHOCYTES NFR BLD AUTO: 42.6 %
MCH RBC QN AUTO: 22.4 PG
MCHC RBC AUTO-ENTMCNC: 30.3 MG/DL (ref 33–36)
MCV RBC AUTO: 74 FL (ref 80–94)
MICROCYTES BLD QL SMEAR: ABNORMAL
MONOCYTES # BLD AUTO: 0.36 X10(3)/MCL (ref 0.1–1.3)
MONOCYTES NFR BLD AUTO: 6.4 %
NEUTROPHILS # BLD AUTO: 2.75 X10(3)/MCL (ref 2.1–9.2)
NEUTROPHILS NFR BLD AUTO: 48.5 %
NRBC BLD AUTO-RTO: 0 %
PLATELET # BLD AUTO: 225 X10(3)/MCL (ref 130–400)
PLATELET # BLD EST: NORMAL 10*3/UL
PMV BLD AUTO: 11.1 FL (ref 7.4–10.4)
POTASSIUM SERPL-SCNC: 4 MMOL/L (ref 3.5–5.1)
PROT SERPL-MCNC: 7.9 GM/DL (ref 6.4–8.3)
PROTHROMBIN TIME: 13 SECONDS (ref 12.5–14.5)
RBC # BLD AUTO: 5.04 X10(6)/MCL (ref 4.2–5.4)
SODIUM SERPL-SCNC: 137 MMOL/L (ref 136–145)
WBC # SPEC AUTO: 5.7 X10(3)/MCL (ref 4.5–11.5)

## 2023-01-20 PROCEDURE — 99214 PR OFFICE/OUTPT VISIT, EST, LEVL IV, 30-39 MIN: ICD-10-PCS | Mod: S$GLB,,, | Performed by: STUDENT IN AN ORGANIZED HEALTH CARE EDUCATION/TRAINING PROGRAM

## 2023-01-20 PROCEDURE — 85610 PROTHROMBIN TIME: CPT

## 2023-01-20 PROCEDURE — 86707 HEPATITIS BE ANTIBODY: CPT

## 2023-01-20 PROCEDURE — 82105 ALPHA-FETOPROTEIN SERUM: CPT

## 2023-01-20 PROCEDURE — 99214 OFFICE O/P EST MOD 30 MIN: CPT | Mod: S$GLB,,, | Performed by: STUDENT IN AN ORGANIZED HEALTH CARE EDUCATION/TRAINING PROGRAM

## 2023-01-20 PROCEDURE — 3075F SYST BP GE 130 - 139MM HG: CPT | Mod: CPTII,S$GLB,, | Performed by: STUDENT IN AN ORGANIZED HEALTH CARE EDUCATION/TRAINING PROGRAM

## 2023-01-20 PROCEDURE — 86706 HEP B SURFACE ANTIBODY: CPT

## 2023-01-20 PROCEDURE — 1160F RVW MEDS BY RX/DR IN RCRD: CPT | Mod: CPTII,S$GLB,, | Performed by: STUDENT IN AN ORGANIZED HEALTH CARE EDUCATION/TRAINING PROGRAM

## 2023-01-20 PROCEDURE — 1159F PR MEDICATION LIST DOCUMENTED IN MEDICAL RECORD: ICD-10-PCS | Mod: CPTII,S$GLB,, | Performed by: STUDENT IN AN ORGANIZED HEALTH CARE EDUCATION/TRAINING PROGRAM

## 2023-01-20 PROCEDURE — 3078F DIAST BP <80 MM HG: CPT | Mod: CPTII,S$GLB,, | Performed by: STUDENT IN AN ORGANIZED HEALTH CARE EDUCATION/TRAINING PROGRAM

## 2023-01-20 PROCEDURE — 1159F MED LIST DOCD IN RCRD: CPT | Mod: CPTII,S$GLB,, | Performed by: STUDENT IN AN ORGANIZED HEALTH CARE EDUCATION/TRAINING PROGRAM

## 2023-01-20 PROCEDURE — 87517 HEPATITIS B DNA QUANT: CPT

## 2023-01-20 PROCEDURE — 3075F PR MOST RECENT SYSTOLIC BLOOD PRESS GE 130-139MM HG: ICD-10-PCS | Mod: CPTII,S$GLB,, | Performed by: STUDENT IN AN ORGANIZED HEALTH CARE EDUCATION/TRAINING PROGRAM

## 2023-01-20 PROCEDURE — 85025 COMPLETE CBC W/AUTO DIFF WBC: CPT

## 2023-01-20 PROCEDURE — 3078F PR MOST RECENT DIASTOLIC BLOOD PRESSURE < 80 MM HG: ICD-10-PCS | Mod: CPTII,S$GLB,, | Performed by: STUDENT IN AN ORGANIZED HEALTH CARE EDUCATION/TRAINING PROGRAM

## 2023-01-20 PROCEDURE — 80053 COMPREHEN METABOLIC PANEL: CPT

## 2023-01-20 PROCEDURE — 3008F PR BODY MASS INDEX (BMI) DOCUMENTED: ICD-10-PCS | Mod: CPTII,S$GLB,, | Performed by: STUDENT IN AN ORGANIZED HEALTH CARE EDUCATION/TRAINING PROGRAM

## 2023-01-20 PROCEDURE — 87340 HEPATITIS B SURFACE AG IA: CPT

## 2023-01-20 PROCEDURE — 87341 HEP B SURFACE AG NEUTRLZJ IA: CPT

## 2023-01-20 PROCEDURE — 87350 HEPATITIS BE AG IA: CPT

## 2023-01-20 PROCEDURE — 1160F PR REVIEW ALL MEDS BY PRESCRIBER/CLIN PHARMACIST DOCUMENTED: ICD-10-PCS | Mod: CPTII,S$GLB,, | Performed by: STUDENT IN AN ORGANIZED HEALTH CARE EDUCATION/TRAINING PROGRAM

## 2023-01-20 PROCEDURE — 36415 COLL VENOUS BLD VENIPUNCTURE: CPT

## 2023-01-20 PROCEDURE — 3008F BODY MASS INDEX DOCD: CPT | Mod: CPTII,S$GLB,, | Performed by: STUDENT IN AN ORGANIZED HEALTH CARE EDUCATION/TRAINING PROGRAM

## 2023-01-20 RX ORDER — FLUTICASONE PROPIONATE 50 MCG
1 SPRAY, SUSPENSION (ML) NASAL
COMMUNITY
Start: 2023-01-12

## 2023-01-20 NOTE — PROGRESS NOTES
Hepatology Follow Up Note    Referring provider: No ref. provider found  PCP: Abby Decker MD    Chief complaint: follow up HBV    HPI:  Madison Hill is a 57 y.o. female with history of hepatitis B who presents to re-establish care.    She was previously followed by Kami Fink NP though lost to follow up for >2 years. New to me today. She reports intermittent nausea and abdominal pain but is otherwise without complaints. No recent hospitalizations or ED visits. She has been without tenofovir for several months. Prior to this, she had intermittent medication compliance. She denies signs of decompensated liver disease including no recent abdominal distension, encephalopathy, jaundice, GI bleeding.    Background (Kami Fink NP)  HPI:  This is a 54 y.o. Black or  female referred for evaluation of chronic hepatitis B diagnosed in 2014. Per chart review, etiology unknown though patient has past drug use history as a risk factor.     Liver enzymes have been stable with small elevations in ALT intermittently, the patient has been placed on Viread via infectious disease physician Alycia (9/2019) after patient had detectable DNA, eAg(-).     Patient previously followed by and biopsied by LANDRY Elder in 2014/2015 w/ concern for long-term antiviral medication management as patient has demonstrated history of noncompliance at the time (concern for skipped/missed doses if started).      Previous serologic w/u negative for Hep A and C in 2018 and 2020     Liver fibrosis staging:  -- Liver biopsy in the past in 2014 that noted F2, chronic hepatitis   --- Fibroscan done 6/2020 noted F0-1 (kPA 5.6), S1 ()     Interval HPI: Presents today alone. Does report some missed doses/ decreased compliance with Viread.   Does have household contacts, she thinks they have been vaccinated   Also reports drinking alcohol heavily (wine and liquor)  Also continues to report RUQ abd pain, no abnormalities noted on US,  do not suspect it is related to liver, again advised pt to f/u with PCP or urgent care for further eval     Labs done 12/2020 show near normal transaminase levels, synthetic liver function  WNL     Hep C testing and HIV testing negative     Hep B labs :  -- + eAb, neg eAg   6/2020  -- DNA <10   6/2020, pending labs today   -- + sAg   6/2020, pending labs today      HCC screening:  Abd U/S done 12/2020 showed no lesions, repeat due 6/2021  AFP to be done today, then due 6/2021  Previous EGD : not currently indicated      Denies family history of liver disease      +Immunity to Hep A       Denies jaundice, dark urine, abdominal distention, hematemesis, melena, slowed mentation. No abnormal skin rashes. No generalized joint or muscle pain.     Past Medical History:   Diagnosis Date    Anxiety     Arthritis     Chronic hepatitis B 10/7/2014    Cocaine use     Depression     GERD (gastroesophageal reflux disease)     Hepatitis B     Hyperlipidemia     IBS (irritable bowel syndrome)     Liver fibrosis stage 2    Sleep apnea        Past Surgical History:   Procedure Laterality Date    CHOLECYSTECTOMY  2018    HYSTERECTOMY      LIVER BIOPSY  10/16/2014    Stage 2 fibrosis    TOTAL REDUCTION MAMMOPLASTY Bilateral 3/3/2020    Procedure: MAMMOPLASTY, REDUCTION;  Surgeon: Naun Fox MD;  Location: [facility];  Service: Plastics;  Laterality: Bilateral;       Family History   Problem Relation Age of Onset    Hyperlipidemia Mother     Hypertension Mother     No Known Problems Father     Hyperlipidemia Sister     Hyperlipidemia Daughter     Diabetes Maternal Grandmother     Colon cancer Neg Hx     Cirrhosis Neg Hx        Social History     Tobacco Use    Smoking status: Light Smoker     Packs/day: 0.50     Years: 45.00     Pack years: 22.50     Types: Cigarettes    Smokeless tobacco: Never   Substance Use Topics    Alcohol use: Yes     Alcohol/week: 1.0 standard drink     Types: 1 Cans of beer per week     Comment: yesterday  Wine cooler    Drug use: Yes     Types: Cocaine     Comment: 1month       Current Outpatient Medications   Medication Sig Dispense Refill    fluticasone propionate (FLONASE) 50 mcg/actuation nasal spray 1 spray by Each Nostril route.      pantoprazole (PROTONIX) 40 MG tablet Take 40 mg by mouth once daily.       acetaminophen-codeine 300-60mg (TYLENOL #4) 300-60 mg Tab       atorvastatin (LIPITOR) 80 MG tablet Take 1 tablet (80 mg total) by mouth once daily. 90 tablet 3    linaCLOtide (LINZESS) 290 mcg Cap capsule Take 1 capsule (290 mcg total) by mouth once daily. (Patient not taking: Reported on 1/20/2023) 90 capsule 3    montelukast (SINGULAIR) 10 mg tablet Take 10 mg by mouth once daily.       omeprazole (PRILOSEC) 20 MG capsule Take 20 mg by mouth once daily.      rosuvastatin (CRESTOR) 40 MG Tab 40 mg once daily.       tenofovir disoproxil fumarate (VIREAD) 300 mg Tab Take 1 tablet (300 mg total) by mouth once daily. (Patient not taking: Reported on 1/20/2023) 30 tablet 0     No current facility-administered medications for this visit.       Review of patient's allergies indicates:   Allergen Reactions    Penicillin g benzathine     Sulfa (sulfonamide antibiotics)     Corticosteroids (glucocorticoids) Palpitations    Morphine Anxiety       Review of Systems   Constitutional:  Negative for fever and weight loss.   Cardiovascular:  Negative for leg swelling.   Gastrointestinal:  Positive for abdominal pain and nausea. Negative for blood in stool, constipation, diarrhea, heartburn, melena and vomiting.     Vitals:    01/20/23 1317   BP: 138/77   Pulse: 96   Weight: 76.7 kg (169 lb 1.5 oz)       Physical Exam  Vitals reviewed.   Constitutional:       General: She is not in acute distress.  Eyes:      General: No scleral icterus.  Cardiovascular:      Rate and Rhythm: Normal rate and regular rhythm.   Pulmonary:      Effort: Pulmonary effort is normal. No respiratory distress.   Abdominal:      General: Bowel  sounds are normal. There is no distension.      Palpations: Abdomen is soft.      Tenderness: There is no abdominal tenderness. There is no guarding or rebound.   Musculoskeletal:      Right lower leg: No edema.      Left lower leg: No edema.   Skin:     Coloration: Skin is not jaundiced.       LABS: I personally reviewed pertinent laboratory findings.    Lab Results   Component Value Date    WBC 5.65 12/29/2020    HGB 11.6 (L) 12/29/2020    HCT 38.7 12/29/2020    MCV 79 (L) 12/29/2020     12/29/2020       Lab Results   Component Value Date     12/29/2020    K 4.0 12/29/2020     12/29/2020    CO2 26 12/29/2020    BUN 12 12/29/2020    CREATININE 0.8 12/29/2020    CALCIUM 9.1 12/29/2020    ANIONGAP 7 (L) 12/29/2020    ESTGFRAFRICA >60.0 12/29/2020    EGFRNONAA >60.0 12/29/2020       Lab Results   Component Value Date    ALT 29 12/29/2020    AST 23 12/29/2020    ALKPHOS 97 12/29/2020    BILITOT 0.4 12/29/2020       Lab Results   Component Value Date    HEPAIGM Negative 10/10/2018    HEPBIGM Negative 10/10/2018    HBEAG NEG 06/04/2020    HEPCAB Negative 06/04/2020       Lab Results   Component Value Date    SARAH NEGATIVE 07/15/2019        IMAGING: I personally reviewed imaging studies.      Assessment:  57 y.o. female with history of hepatitis B who presents [facility] after being lost to follow up. She has history of alcohol use disorder as well as illicit substance use. Previously treated with tenofovir with intermittent medication compliance. She has been off of tenofovir for several months. Liver biopsy 10/2014 showed stage 2 fibrosis though unclear if this is related to hepatitis B or alcohol use. FibroScan 06/2020 showed S1 steatosis and F0-1 fibrosis.    1. Chronic hepatitis B        Recommendations:  - Repeat LFTs as well as hepatitis B serologies and viral load.  - Obtain US and AFP for HCC screening. Repeat every 6 months.  - If no clear indication for treatment (elevated LFTs, high  viral load, cirrhsosis), hesitant to restart antiviral treatment given her long standing history of noncompliance. I worry about the possibility of antiviral resistance with intermittent use.  - Counseled on complete cessation of alcohol and illicit drugs. Discussed risks of worsening liver function and death with continued alcohol use.    Return to clinic in 3 months with labs.    I spent a total of 30 minutes on the day of the visit. This includes face to face time and non-face to face time preparing to see the patient (eg, review of tests), obtaining and/or reviewing separately obtained history, documenting clinical information in the electronic or other health record, independently interpreting results, and communicating results to the patient/family/caregiver, or care coordination.    Leandro Christiansen MD  Staff Physician  Hepatology and Liver Transplant  [facility]  [facility]

## 2023-01-21 LAB
HBV E AG SERPL QL IA: NEGATIVE
HBV E IGG SER QL IA: POSITIVE
HBV SURFACE AB SER QL IA: NEGATIVE
HBV SURFACE AB SERPL IA-ACNC: <5 MIU/ML

## 2023-01-23 LAB — HBV DNA SERPL NAA+PROBE-ACNC: ABNORMAL IU/ML

## 2023-01-24 ENCOUNTER — TELEPHONE (OUTPATIENT)
Dept: HEPATOLOGY | Facility: CLINIC | Age: 58
End: 2023-01-24
Payer: MEDICAID

## 2023-01-24 LAB — HBV SURFACE AG SERPLBLD QL IA.RAPID: NORMAL

## 2023-01-24 NOTE — TELEPHONE ENCOUNTER
Called patient. Scheduled follow up in [place]. Patient said that she needs to know her lab results. I told her, when your provider review the results we will contact you. She yelled and was being rude.

## 2023-01-24 NOTE — TELEPHONE ENCOUNTER
----- Message from Leandro Christiansen MD sent at 1/24/2023  5:49 AM CST -----  US 1-2 weeks. Return in [place] in 3 months with labs.

## 2023-01-26 ENCOUNTER — TELEPHONE (OUTPATIENT)
Dept: HEPATOLOGY | Facility: CLINIC | Age: 58
End: 2023-01-26
Payer: MEDICAID

## 2023-01-26 NOTE — TELEPHONE ENCOUNTER
----- Message From: Leandro Christiansen MD---  Please let Ms. Hill know that her liver tests are normal. This indicates that hepatitis B is not causing damage to her liver, and she currently does not need hepatitis B treatment.    Patient called and message relayed from Dr Christiansen.  Patient allowed to verbalize concerns. Questions answered.    Patient encouraged to keep appointment for US. Patient stated it is for tomorrow 1/27/23.  Yes. Please keep appt.  You have a Follow Up on 4/3/23 at 9:30 with Dr Christiansen in [place]. I will send you a reminder in the mail. Voiced understanding.

## 2023-01-26 NOTE — TELEPHONE ENCOUNTER
----- Message from Amy Celeste MA sent at 1/26/2023 11:13 AM CST -----    ----- Message -----  From: Leandro Christiansen MD  Sent: 1/25/2023   8:57 PM CST  To: [facility] Staff    Please let Ms. Hill know that her liver tests are normal. This indicates that hepatitis B is not causing damage to her liver, and she currently does not need hepatitis B treatment.

## 2023-01-27 ENCOUNTER — HOSPITAL ENCOUNTER (OUTPATIENT)
Dept: RADIOLOGY | Facility: HOSPITAL | Age: 58
Discharge: HOME | End: 2023-01-27
Attending: STUDENT IN AN ORGANIZED HEALTH CARE EDUCATION/TRAINING PROGRAM
Payer: MEDICAID

## 2023-01-27 DIAGNOSIS — B18.1 CHRONIC HEPATITIS B: ICD-10-CM

## 2023-01-27 PROCEDURE — 76705 ECHO EXAM OF ABDOMEN: CPT | Mod: TC

## 2023-02-14 ENCOUNTER — TELEPHONE (OUTPATIENT)
Dept: HEPATOLOGY | Facility: CLINIC | Age: 58
End: 2023-02-14
Payer: MEDICAID

## 2023-02-14 NOTE — TELEPHONE ENCOUNTER
----- Message from Leandro Christiansen MD sent at 2/13/2023  8:51 PM CST -----  Please let Ms. Hill know that her ultrasound showed no signs of liver cancer.

## 2023-03-13 ENCOUNTER — TELEPHONE (OUTPATIENT)
Dept: HEPATOLOGY | Facility: CLINIC | Age: 58
End: 2023-03-13
Payer: MEDICAID

## 2023-07-13 ENCOUNTER — TELEPHONE (OUTPATIENT)
Dept: HEPATOLOGY | Facility: CLINIC | Age: 58
End: 2023-07-13
Payer: MEDICAID

## 2023-07-13 NOTE — TELEPHONE ENCOUNTER
----- Message from Donny Brown MA sent at 7/13/2023  3:00 PM CDT -----  Regarding: FW: soon appt    ----- Message -----  From: Carmella Houston  Sent: 7/13/2023   2:22 PM CDT  To: [facility] Staff  Subject: soon appt                                        The patient called requesting to schedule as soon as possible  Having pain all over, nausea,. Vomiting and abdominal pain daily  Unable to keep anything down  #Pt is in [place]#    No further information provided      Patient can be contacted @# [phone] (home)

## 2023-08-03 NOTE — TELEPHONE ENCOUNTER
Called pt about again missed appt. Patient has missed/no showed multiple appts in the past 2 years. Last seen in 2020. Pt reports that she has no plans to travel to [place] for follow up and does not have a smart phone/computer/tablet to complete a video visit. Offered to contact one of her family members if they can help her connect to a video visit but she was not interested     Pt reports she is currently getting Hep B refills from her last PCP    Previously scheduled with Dr. Christiansen in [place] but she missed those appts also. Offered to reschedule appt with Dr. Christiansen, pt agreed and will continue to fill her Hep B medication through her PCP until then     Please contact pt to schedule f/u with Dr. Christiansen in [place] with labs and US same day as visit     Thanks !  
Contacted pt and confirmed appts  
Wasn't able to LVM   
4

## (undated) DEVICE — SEE MEDLINE ITEM 146345

## (undated) DEVICE — BRA MAMMARY SUPPORT LARGE

## (undated) DEVICE — CHLORAPREP 10.5 ML APPLICATOR

## (undated) DEVICE — APPLICATOR CHLORAPREP ORN 26ML

## (undated) DEVICE — EVACUATOR WOUND BULB 100CC

## (undated) DEVICE — DRESSING TRANS 4X4 TEGADERM

## (undated) DEVICE — COVER CAMERA OPERATING ROOM

## (undated) DEVICE — POSITIONER HEAD DONUT 9IN FOAM

## (undated) DEVICE — STAPLER SKIN PROXIMATE WIDE

## (undated) DEVICE — CONTAINER LAB W/84 OZ LID

## (undated) DEVICE — SUT VICRYL PLUS 2-0 CT1 18

## (undated) DEVICE — SEE MEDLINE ITEM 152622

## (undated) DEVICE — ELECTRODE REM PLYHSV RETURN 9

## (undated) DEVICE — PACK DRAPE UNIVERSAL CONVERTOR

## (undated) DEVICE — SOL NACL 0.9% INJ PF/100 150

## (undated) DEVICE — GLOVE SURG BIOGEL LATEX SZ 7.5

## (undated) DEVICE — SET DECANTER MEDICHOICE

## (undated) DEVICE — SEE MEDLINE ITEM 152522

## (undated) DEVICE — DRAIN WND 15FRX3/16X4.7MM TRCR

## (undated) DEVICE — DRESSING CURITY WOUND 10X30IN

## (undated) DEVICE — NDL SPINAL 20GX3.5 HUB

## (undated) DEVICE — SEE MEDLINE ITEM 157027

## (undated) DEVICE — SPONGE LAP 18X18 PREWASHED

## (undated) DEVICE — MANIFOLD 4 PORT

## (undated) DEVICE — COTTONBALL LG ST

## (undated) DEVICE — BLADE SURG CARBON STEEL #10

## (undated) DEVICE — STAPLER SKIN SUBCUTICULAR

## (undated) DEVICE — SYR 10CC LUER LOCK

## (undated) DEVICE — SUT 2/0 30IN SILK BLK BRAI

## (undated) DEVICE — COVER LIGHT HANDLE 80/CA

## (undated) DEVICE — SEE MEDLINE ITEM 157117

## (undated) DEVICE — SUT MONOCRYL 4-0 PS-1 UND